# Patient Record
Sex: MALE | Race: OTHER | HISPANIC OR LATINO | Employment: FULL TIME | URBAN - METROPOLITAN AREA
[De-identification: names, ages, dates, MRNs, and addresses within clinical notes are randomized per-mention and may not be internally consistent; named-entity substitution may affect disease eponyms.]

---

## 2020-06-15 PROBLEM — R82.90 ABNORMAL URINE: Status: ACTIVE | Noted: 2020-06-15

## 2020-06-15 PROBLEM — D72.829 LEUKOCYTOSIS: Status: ACTIVE | Noted: 2020-06-15

## 2020-06-15 PROBLEM — R73.9 HYPERGLYCEMIA: Status: ACTIVE | Noted: 2020-06-15

## 2020-06-15 PROBLEM — Z78.9 ALCOHOL USE: Status: ACTIVE | Noted: 2020-06-15

## 2020-06-15 PROBLEM — K85.90 ACUTE PANCREATITIS: Status: ACTIVE | Noted: 2020-06-15

## 2020-06-15 PROBLEM — Z72.0 TOBACCO USE: Status: ACTIVE | Noted: 2020-06-15

## 2020-06-15 PROBLEM — R79.89 ELEVATED LFTS: Status: ACTIVE | Noted: 2020-06-15

## 2020-06-15 PROBLEM — E87.1 HYPONATREMIA: Status: ACTIVE | Noted: 2020-06-15

## 2020-06-15 PROBLEM — D75.89 MACROCYTOSIS: Status: ACTIVE | Noted: 2020-06-15

## 2020-06-16 ENCOUNTER — HOSPITAL ENCOUNTER (INPATIENT)
Facility: MEDICAL CENTER | Age: 35
LOS: 4 days | DRG: 439 | End: 2020-06-20
Attending: HOSPITALIST | Admitting: INTERNAL MEDICINE
Payer: COMMERCIAL

## 2020-06-16 ENCOUNTER — HOSPITAL ENCOUNTER (OUTPATIENT)
Facility: MEDICAL CENTER | Age: 35
DRG: 439 | End: 2020-06-16
Admitting: HOSPITALIST
Payer: COMMERCIAL

## 2020-06-16 PROBLEM — E78.1 HYPERTRIGLYCERIDEMIA: Status: ACTIVE | Noted: 2020-06-16

## 2020-06-16 PROBLEM — E83.51 HYPOCALCEMIA: Status: ACTIVE | Noted: 2020-06-16

## 2020-06-16 LAB
ANION GAP SERPL CALC-SCNC: 11 MMOL/L (ref 7–16)
BUN SERPL-MCNC: 27 MG/DL (ref 8–22)
CALCIUM SERPL-MCNC: 7.1 MG/DL (ref 8.5–10.5)
CHLORIDE SERPL-SCNC: 103 MMOL/L (ref 96–112)
CO2 SERPL-SCNC: 15 MMOL/L (ref 20–33)
CREAT SERPL-MCNC: 1.16 MG/DL (ref 0.5–1.4)
GLUCOSE SERPL-MCNC: 162 MG/DL (ref 65–99)
MAGNESIUM SERPL-MCNC: 1.8 MG/DL (ref 1.5–2.5)
POTASSIUM SERPL-SCNC: 4.2 MMOL/L (ref 3.6–5.5)
SODIUM SERPL-SCNC: 129 MMOL/L (ref 135–145)
TRIGL SERPL-MCNC: 668 MG/DL (ref 0–149)

## 2020-06-16 PROCEDURE — 84478 ASSAY OF TRIGLYCERIDES: CPT

## 2020-06-16 PROCEDURE — 80048 BASIC METABOLIC PNL TOTAL CA: CPT | Mod: 91

## 2020-06-16 PROCEDURE — 700111 HCHG RX REV CODE 636 W/ 250 OVERRIDE (IP): Performed by: PSYCHIATRY & NEUROLOGY

## 2020-06-16 PROCEDURE — 770022 HCHG ROOM/CARE - ICU (200)

## 2020-06-16 PROCEDURE — 700105 HCHG RX REV CODE 258: Performed by: PSYCHIATRY & NEUROLOGY

## 2020-06-16 PROCEDURE — 700105 HCHG RX REV CODE 258: Performed by: INTERNAL MEDICINE

## 2020-06-16 PROCEDURE — 82962 GLUCOSE BLOOD TEST: CPT | Mod: 91

## 2020-06-16 PROCEDURE — 99291 CRITICAL CARE FIRST HOUR: CPT | Performed by: PSYCHIATRY & NEUROLOGY

## 2020-06-16 PROCEDURE — 83735 ASSAY OF MAGNESIUM: CPT

## 2020-06-16 PROCEDURE — 700102 HCHG RX REV CODE 250 W/ 637 OVERRIDE(OP): Performed by: PSYCHIATRY & NEUROLOGY

## 2020-06-16 RX ORDER — SODIUM CHLORIDE, SODIUM LACTATE, POTASSIUM CHLORIDE, AND CALCIUM CHLORIDE .6; .31; .03; .02 G/100ML; G/100ML; G/100ML; G/100ML
1000 INJECTION, SOLUTION INTRAVENOUS ONCE
Status: COMPLETED | OUTPATIENT
Start: 2020-06-16 | End: 2020-06-17

## 2020-06-16 RX ORDER — DEXTROSE AND SODIUM CHLORIDE 10; .45 G/100ML; G/100ML
INJECTION, SOLUTION INTRAVENOUS CONTINUOUS
Status: DISCONTINUED | OUTPATIENT
Start: 2020-06-17 | End: 2020-06-18

## 2020-06-16 RX ORDER — SODIUM CHLORIDE, SODIUM LACTATE, POTASSIUM CHLORIDE, CALCIUM CHLORIDE 600; 310; 30; 20 MG/100ML; MG/100ML; MG/100ML; MG/100ML
INJECTION, SOLUTION INTRAVENOUS
Status: ACTIVE
Start: 2020-06-16 | End: 2020-06-17

## 2020-06-16 RX ORDER — SODIUM CHLORIDE, SODIUM LACTATE, POTASSIUM CHLORIDE, CALCIUM CHLORIDE 600; 310; 30; 20 MG/100ML; MG/100ML; MG/100ML; MG/100ML
2000 INJECTION, SOLUTION INTRAVENOUS CONTINUOUS
Status: DISCONTINUED | OUTPATIENT
Start: 2020-06-16 | End: 2020-06-17

## 2020-06-16 RX ORDER — DEXTROSE AND SODIUM CHLORIDE 5; .45 G/100ML; G/100ML
INJECTION, SOLUTION INTRAVENOUS
Status: ACTIVE
Start: 2020-06-16 | End: 2020-06-17

## 2020-06-16 RX ORDER — DEXTROSE MONOHYDRATE 25 G/50ML
50 INJECTION, SOLUTION INTRAVENOUS
Status: DISCONTINUED | OUTPATIENT
Start: 2020-06-16 | End: 2020-06-18

## 2020-06-16 RX ORDER — DEXTROSE MONOHYDRATE 25 G/50ML
25-50 INJECTION, SOLUTION INTRAVENOUS PRN
Status: DISCONTINUED | OUTPATIENT
Start: 2020-06-16 | End: 2020-06-16

## 2020-06-16 RX ADMIN — DEXTROSE AND SODIUM CHLORIDE: 10; .45 INJECTION, SOLUTION INTRAVENOUS at 23:51

## 2020-06-16 RX ADMIN — SODIUM CHLORIDE 10 UNITS/HR: 9 INJECTION, SOLUTION INTRAVENOUS at 23:57

## 2020-06-16 RX ADMIN — FENTANYL CITRATE 50 MCG: 50 INJECTION INTRAMUSCULAR; INTRAVENOUS at 23:32

## 2020-06-16 RX ADMIN — SODIUM CHLORIDE, POTASSIUM CHLORIDE, SODIUM LACTATE AND CALCIUM CHLORIDE 1000 ML: 600; 310; 30; 20 INJECTION, SOLUTION INTRAVENOUS at 23:13

## 2020-06-16 RX ADMIN — SODIUM CHLORIDE, POTASSIUM CHLORIDE, SODIUM LACTATE AND CALCIUM CHLORIDE 2000 ML: 600; 310; 30; 20 INJECTION, SOLUTION INTRAVENOUS at 23:38

## 2020-06-16 ASSESSMENT — ENCOUNTER SYMPTOMS
ABDOMINAL PAIN: 1
MUSCULOSKELETAL NEGATIVE: 1
NEUROLOGICAL NEGATIVE: 1
FEVER: 0
WEIGHT LOSS: 0
COUGH: 0
VOMITING: 0
CHILLS: 0
SHORTNESS OF BREATH: 1
EYES NEGATIVE: 1
PSYCHIATRIC NEGATIVE: 1
NAUSEA: 1
CONSTIPATION: 1

## 2020-06-16 ASSESSMENT — COGNITIVE AND FUNCTIONAL STATUS - GENERAL
MOBILITY SCORE: 24
SUGGESTED CMS G CODE MODIFIER DAILY ACTIVITY: CH
DAILY ACTIVITIY SCORE: 24
SUGGESTED CMS G CODE MODIFIER MOBILITY: CH

## 2020-06-16 ASSESSMENT — LIFESTYLE VARIABLES
CONSUMPTION TOTAL: POSITIVE
EVER HAD A DRINK FIRST THING IN THE MORNING TO STEADY YOUR NERVES TO GET RID OF A HANGOVER: NO
EVER_SMOKED: YES
TOTAL SCORE: 2
ON A TYPICAL DAY WHEN YOU DRINK ALCOHOL HOW MANY DRINKS DO YOU HAVE: 2
HAVE YOU EVER FELT YOU SHOULD CUT DOWN ON YOUR DRINKING: YES
EVER FELT BAD OR GUILTY ABOUT YOUR DRINKING: NO
TOTAL SCORE: 2
AVERAGE NUMBER OF DAYS PER WEEK YOU HAVE A DRINK CONTAINING ALCOHOL: 14
DOES PATIENT WANT TO STOP DRINKING: YES
PACK_YEARS: 1
HOW MANY TIMES IN THE PAST YEAR HAVE YOU HAD 5 OR MORE DRINKS IN A DAY: 3
DOES PATIENT WANT TO TALK TO SOMEONE ABOUT QUITTING: NO
TOTAL SCORE: 2
ALCOHOL_USE: YES
HAVE PEOPLE ANNOYED YOU BY CRITICIZING YOUR DRINKING: YES

## 2020-06-16 ASSESSMENT — FIBROSIS 4 INDEX: FIB4 SCORE: 1.82

## 2020-06-16 ASSESSMENT — PATIENT HEALTH QUESTIONNAIRE - PHQ9
1. LITTLE INTEREST OR PLEASURE IN DOING THINGS: NOT AT ALL
2. FEELING DOWN, DEPRESSED, IRRITABLE, OR HOPELESS: NOT AT ALL
SUM OF ALL RESPONSES TO PHQ9 QUESTIONS 1 AND 2: 0

## 2020-06-17 ENCOUNTER — APPOINTMENT (OUTPATIENT)
Dept: RADIOLOGY | Facility: MEDICAL CENTER | Age: 35
DRG: 439 | End: 2020-06-17
Attending: INTERNAL MEDICINE
Payer: COMMERCIAL

## 2020-06-17 LAB
ANION GAP SERPL CALC-SCNC: 7 MMOL/L (ref 7–16)
ANISOCYTOSIS BLD QL SMEAR: ABNORMAL
BASO STIPL BLD QL SMEAR: NORMAL
BASOPHILS # BLD AUTO: 0 % (ref 0–1.8)
BASOPHILS # BLD AUTO: 0 % (ref 0–1.8)
BASOPHILS # BLD: 0 K/UL (ref 0–0.12)
BASOPHILS # BLD: 0 K/UL (ref 0–0.12)
BUN SERPL-MCNC: 20 MG/DL (ref 8–22)
CALCIUM SERPL-MCNC: 7.3 MG/DL (ref 8.5–10.5)
CHLORIDE SERPL-SCNC: 104 MMOL/L (ref 96–112)
CO2 SERPL-SCNC: 18 MMOL/L (ref 20–33)
CREAT SERPL-MCNC: 0.83 MG/DL (ref 0.5–1.4)
EKG IMPRESSION: NORMAL
EOSINOPHIL # BLD AUTO: 0 K/UL (ref 0–0.51)
EOSINOPHIL # BLD AUTO: 0.12 K/UL (ref 0–0.51)
EOSINOPHIL NFR BLD: 0 % (ref 0–6.9)
EOSINOPHIL NFR BLD: 1.7 % (ref 0–6.9)
ERYTHROCYTE [DISTWIDTH] IN BLOOD BY AUTOMATED COUNT: 53.5 FL (ref 35.9–50)
ERYTHROCYTE [DISTWIDTH] IN BLOOD BY AUTOMATED COUNT: 55.5 FL (ref 35.9–50)
GLUCOSE BLD-MCNC: 100 MG/DL (ref 65–99)
GLUCOSE BLD-MCNC: 107 MG/DL (ref 65–99)
GLUCOSE BLD-MCNC: 108 MG/DL (ref 65–99)
GLUCOSE BLD-MCNC: 110 MG/DL (ref 65–99)
GLUCOSE BLD-MCNC: 111 MG/DL (ref 65–99)
GLUCOSE BLD-MCNC: 114 MG/DL (ref 65–99)
GLUCOSE BLD-MCNC: 114 MG/DL (ref 65–99)
GLUCOSE BLD-MCNC: 115 MG/DL (ref 65–99)
GLUCOSE BLD-MCNC: 116 MG/DL (ref 65–99)
GLUCOSE BLD-MCNC: 120 MG/DL (ref 65–99)
GLUCOSE BLD-MCNC: 120 MG/DL (ref 65–99)
GLUCOSE BLD-MCNC: 121 MG/DL (ref 65–99)
GLUCOSE BLD-MCNC: 122 MG/DL (ref 65–99)
GLUCOSE BLD-MCNC: 123 MG/DL (ref 65–99)
GLUCOSE BLD-MCNC: 124 MG/DL (ref 65–99)
GLUCOSE BLD-MCNC: 126 MG/DL (ref 65–99)
GLUCOSE BLD-MCNC: 128 MG/DL (ref 65–99)
GLUCOSE BLD-MCNC: 133 MG/DL (ref 65–99)
GLUCOSE BLD-MCNC: 141 MG/DL (ref 65–99)
GLUCOSE BLD-MCNC: 141 MG/DL (ref 65–99)
GLUCOSE BLD-MCNC: 142 MG/DL (ref 65–99)
GLUCOSE BLD-MCNC: 144 MG/DL (ref 65–99)
GLUCOSE BLD-MCNC: 145 MG/DL (ref 65–99)
GLUCOSE BLD-MCNC: 149 MG/DL (ref 65–99)
GLUCOSE BLD-MCNC: 154 MG/DL (ref 65–99)
GLUCOSE BLD-MCNC: 154 MG/DL (ref 65–99)
GLUCOSE BLD-MCNC: 171 MG/DL (ref 65–99)
GLUCOSE BLD-MCNC: 174 MG/DL (ref 65–99)
GLUCOSE BLD-MCNC: 92 MG/DL (ref 65–99)
GLUCOSE SERPL-MCNC: 125 MG/DL (ref 65–99)
HCT VFR BLD AUTO: 45.8 % (ref 42–52)
HCT VFR BLD AUTO: 46.4 % (ref 42–52)
HGB BLD-MCNC: 15.4 G/DL (ref 14–18)
HGB BLD-MCNC: 15.4 G/DL (ref 14–18)
LACTATE BLD-SCNC: 1.4 MMOL/L (ref 0.5–2)
LIPASE SERPL-CCNC: 811 U/L (ref 11–82)
LYMPHOCYTES # BLD AUTO: 0.96 K/UL (ref 1–4.8)
LYMPHOCYTES # BLD AUTO: 1.07 K/UL (ref 1–4.8)
LYMPHOCYTES NFR BLD: 13.2 % (ref 22–41)
LYMPHOCYTES NFR BLD: 14.9 % (ref 22–41)
MACROCYTES BLD QL SMEAR: ABNORMAL
MAGNESIUM SERPL-MCNC: 2.1 MG/DL (ref 1.5–2.5)
MANUAL DIFF BLD: NORMAL
MANUAL DIFF BLD: NORMAL
MCH RBC QN AUTO: 33.1 PG (ref 27–33)
MCH RBC QN AUTO: 33.3 PG (ref 27–33)
MCHC RBC AUTO-ENTMCNC: 33.2 G/DL (ref 33.7–35.3)
MCHC RBC AUTO-ENTMCNC: 33.6 G/DL (ref 33.7–35.3)
MCV RBC AUTO: 99.1 FL (ref 81.4–97.8)
MCV RBC AUTO: 99.8 FL (ref 81.4–97.8)
METAMYELOCYTES NFR BLD MANUAL: 2.6 %
MICROCYTES BLD QL SMEAR: ABNORMAL
MONOCYTES # BLD AUTO: 0.48 K/UL (ref 0–0.85)
MONOCYTES # BLD AUTO: 0.57 K/UL (ref 0–0.85)
MONOCYTES NFR BLD AUTO: 6.6 % (ref 0–13.4)
MONOCYTES NFR BLD AUTO: 7.9 % (ref 0–13.4)
MORPHOLOGY BLD-IMP: NORMAL
MORPHOLOGY BLD-IMP: NORMAL
MYELOCYTES NFR BLD MANUAL: 0.8 %
NEUTROPHILS # BLD AUTO: 5.37 K/UL (ref 1.82–7.42)
NEUTROPHILS # BLD AUTO: 5.67 K/UL (ref 1.82–7.42)
NEUTROPHILS NFR BLD: 70.2 % (ref 44–72)
NEUTROPHILS NFR BLD: 77.7 % (ref 44–72)
NEUTS BAND NFR BLD MANUAL: 4.4 % (ref 0–10)
NRBC # BLD AUTO: 0 K/UL
NRBC # BLD AUTO: 0 K/UL
NRBC BLD-RTO: 0 /100 WBC
NRBC BLD-RTO: 0 /100 WBC
OVALOCYTES BLD QL SMEAR: NORMAL
PLATELET # BLD AUTO: 124 K/UL (ref 164–446)
PLATELET # BLD AUTO: 134 K/UL (ref 164–446)
PLATELET BLD QL SMEAR: NORMAL
PLATELET BLD QL SMEAR: NORMAL
PMV BLD AUTO: 10 FL (ref 9–12.9)
PMV BLD AUTO: 9.6 FL (ref 9–12.9)
POIKILOCYTOSIS BLD QL SMEAR: NORMAL
POLYCHROMASIA BLD QL SMEAR: NORMAL
POTASSIUM SERPL-SCNC: 3.9 MMOL/L (ref 3.6–5.5)
POTASSIUM SERPL-SCNC: 4 MMOL/L (ref 3.6–5.5)
POTASSIUM SERPL-SCNC: 4 MMOL/L (ref 3.6–5.5)
RBC # BLD AUTO: 4.62 M/UL (ref 4.7–6.1)
RBC # BLD AUTO: 4.65 M/UL (ref 4.7–6.1)
RBC BLD AUTO: PRESENT
SODIUM SERPL-SCNC: 129 MMOL/L (ref 135–145)
TRIGL SERPL-MCNC: 499 MG/DL (ref 0–149)
TRIGL SERPL-MCNC: 537 MG/DL (ref 0–149)
WBC # BLD AUTO: 7.2 K/UL (ref 4.8–10.8)
WBC # BLD AUTO: 7.3 K/UL (ref 4.8–10.8)

## 2020-06-17 PROCEDURE — 84478 ASSAY OF TRIGLYCERIDES: CPT

## 2020-06-17 PROCEDURE — 700105 HCHG RX REV CODE 258: Performed by: INTERNAL MEDICINE

## 2020-06-17 PROCEDURE — 93010 ELECTROCARDIOGRAM REPORT: CPT | Performed by: INTERNAL MEDICINE

## 2020-06-17 PROCEDURE — 83735 ASSAY OF MAGNESIUM: CPT

## 2020-06-17 PROCEDURE — 700111 HCHG RX REV CODE 636 W/ 250 OVERRIDE (IP): Performed by: INTERNAL MEDICINE

## 2020-06-17 PROCEDURE — 700111 HCHG RX REV CODE 636 W/ 250 OVERRIDE (IP): Performed by: PSYCHIATRY & NEUROLOGY

## 2020-06-17 PROCEDURE — 84132 ASSAY OF SERUM POTASSIUM: CPT | Mod: 91

## 2020-06-17 PROCEDURE — 83605 ASSAY OF LACTIC ACID: CPT

## 2020-06-17 PROCEDURE — 99406 BEHAV CHNG SMOKING 3-10 MIN: CPT | Performed by: HOSPITALIST

## 2020-06-17 PROCEDURE — 99292 CRITICAL CARE ADDL 30 MIN: CPT | Performed by: INTERNAL MEDICINE

## 2020-06-17 PROCEDURE — 700102 HCHG RX REV CODE 250 W/ 637 OVERRIDE(OP): Performed by: PSYCHIATRY & NEUROLOGY

## 2020-06-17 PROCEDURE — 700102 HCHG RX REV CODE 250 W/ 637 OVERRIDE(OP): Performed by: HOSPITALIST

## 2020-06-17 PROCEDURE — 80048 BASIC METABOLIC PNL TOTAL CA: CPT

## 2020-06-17 PROCEDURE — 85007 BL SMEAR W/DIFF WBC COUNT: CPT

## 2020-06-17 PROCEDURE — 3E0G76Z INTRODUCTION OF NUTRITIONAL SUBSTANCE INTO UPPER GI, VIA NATURAL OR ARTIFICIAL OPENING: ICD-10-PCS | Performed by: INTERNAL MEDICINE

## 2020-06-17 PROCEDURE — 770022 HCHG ROOM/CARE - ICU (200)

## 2020-06-17 PROCEDURE — A9270 NON-COVERED ITEM OR SERVICE: HCPCS | Performed by: HOSPITALIST

## 2020-06-17 PROCEDURE — 82962 GLUCOSE BLOOD TEST: CPT | Mod: 91

## 2020-06-17 PROCEDURE — 87040 BLOOD CULTURE FOR BACTERIA: CPT

## 2020-06-17 PROCEDURE — 99223 1ST HOSP IP/OBS HIGH 75: CPT | Mod: 25 | Performed by: HOSPITALIST

## 2020-06-17 PROCEDURE — 93005 ELECTROCARDIOGRAM TRACING: CPT | Performed by: INTERNAL MEDICINE

## 2020-06-17 PROCEDURE — 99358 PROLONG SERVICE W/O CONTACT: CPT | Performed by: HOSPITALIST

## 2020-06-17 PROCEDURE — 700105 HCHG RX REV CODE 258

## 2020-06-17 PROCEDURE — 85027 COMPLETE CBC AUTOMATED: CPT

## 2020-06-17 PROCEDURE — 700105 HCHG RX REV CODE 258: Performed by: PSYCHIATRY & NEUROLOGY

## 2020-06-17 PROCEDURE — 700101 HCHG RX REV CODE 250: Performed by: INTERNAL MEDICINE

## 2020-06-17 PROCEDURE — 700111 HCHG RX REV CODE 636 W/ 250 OVERRIDE (IP): Performed by: HOSPITALIST

## 2020-06-17 PROCEDURE — 99291 CRITICAL CARE FIRST HOUR: CPT | Performed by: INTERNAL MEDICINE

## 2020-06-17 PROCEDURE — 700102 HCHG RX REV CODE 250 W/ 637 OVERRIDE(OP): Performed by: INTERNAL MEDICINE

## 2020-06-17 PROCEDURE — 71045 X-RAY EXAM CHEST 1 VIEW: CPT

## 2020-06-17 PROCEDURE — A9270 NON-COVERED ITEM OR SERVICE: HCPCS | Performed by: INTERNAL MEDICINE

## 2020-06-17 PROCEDURE — 83690 ASSAY OF LIPASE: CPT

## 2020-06-17 RX ORDER — HYDRALAZINE HYDROCHLORIDE 20 MG/ML
10-20 INJECTION INTRAMUSCULAR; INTRAVENOUS EVERY 4 HOURS PRN
Status: DISCONTINUED | OUTPATIENT
Start: 2020-06-17 | End: 2020-06-20 | Stop reason: HOSPADM

## 2020-06-17 RX ORDER — POTASSIUM CHLORIDE 7.45 MG/ML
10 INJECTION INTRAVENOUS ONCE
Status: COMPLETED | OUTPATIENT
Start: 2020-06-17 | End: 2020-06-17

## 2020-06-17 RX ORDER — BISACODYL 10 MG
10 SUPPOSITORY, RECTAL RECTAL
Status: DISCONTINUED | OUTPATIENT
Start: 2020-06-17 | End: 2020-06-17

## 2020-06-17 RX ORDER — POLYETHYLENE GLYCOL 3350 17 G/17G
1 POWDER, FOR SOLUTION ORAL
Status: DISCONTINUED | OUTPATIENT
Start: 2020-06-17 | End: 2020-06-17

## 2020-06-17 RX ORDER — HYDRALAZINE HYDROCHLORIDE 20 MG/ML
10 INJECTION INTRAMUSCULAR; INTRAVENOUS EVERY 6 HOURS PRN
Status: DISCONTINUED | OUTPATIENT
Start: 2020-06-17 | End: 2020-06-17

## 2020-06-17 RX ORDER — HYDRALAZINE HYDROCHLORIDE 20 MG/ML
20 INJECTION INTRAMUSCULAR; INTRAVENOUS ONCE
Status: COMPLETED | OUTPATIENT
Start: 2020-06-17 | End: 2020-06-17

## 2020-06-17 RX ORDER — POLYETHYLENE GLYCOL 3350 17 G/17G
1 POWDER, FOR SOLUTION ORAL
Status: DISCONTINUED | OUTPATIENT
Start: 2020-06-17 | End: 2020-06-20 | Stop reason: HOSPADM

## 2020-06-17 RX ORDER — AMOXICILLIN 250 MG
2 CAPSULE ORAL 2 TIMES DAILY
Status: DISCONTINUED | OUTPATIENT
Start: 2020-06-17 | End: 2020-06-17

## 2020-06-17 RX ORDER — NICOTINE 21 MG/24HR
14 PATCH, TRANSDERMAL 24 HOURS TRANSDERMAL
Status: DISCONTINUED | OUTPATIENT
Start: 2020-06-17 | End: 2020-06-20 | Stop reason: HOSPADM

## 2020-06-17 RX ORDER — SODIUM CHLORIDE, SODIUM LACTATE, POTASSIUM CHLORIDE, CALCIUM CHLORIDE 600; 310; 30; 20 MG/100ML; MG/100ML; MG/100ML; MG/100ML
2000 INJECTION, SOLUTION INTRAVENOUS CONTINUOUS
Status: DISCONTINUED | OUTPATIENT
Start: 2020-06-17 | End: 2020-06-17

## 2020-06-17 RX ORDER — BISACODYL 10 MG
10 SUPPOSITORY, RECTAL RECTAL
Status: DISCONTINUED | OUTPATIENT
Start: 2020-06-17 | End: 2020-06-20 | Stop reason: HOSPADM

## 2020-06-17 RX ORDER — DEXTROSE AND SODIUM CHLORIDE 5; .45 G/100ML; G/100ML
INJECTION, SOLUTION INTRAVENOUS
Status: ACTIVE
Start: 2020-06-17 | End: 2020-06-18

## 2020-06-17 RX ORDER — SODIUM CHLORIDE 9 MG/ML
INJECTION, SOLUTION INTRAVENOUS
Status: COMPLETED
Start: 2020-06-17 | End: 2020-06-17

## 2020-06-17 RX ORDER — AMOXICILLIN 250 MG
2 CAPSULE ORAL 2 TIMES DAILY
Status: DISCONTINUED | OUTPATIENT
Start: 2020-06-17 | End: 2020-06-20 | Stop reason: HOSPADM

## 2020-06-17 RX ORDER — DEXTROSE AND SODIUM CHLORIDE 5; .45 G/100ML; G/100ML
INJECTION, SOLUTION INTRAVENOUS
Status: ACTIVE
Start: 2020-06-17 | End: 2020-06-17

## 2020-06-17 RX ORDER — LABETALOL HYDROCHLORIDE 5 MG/ML
10-20 INJECTION, SOLUTION INTRAVENOUS EVERY 4 HOURS PRN
Status: DISCONTINUED | OUTPATIENT
Start: 2020-06-17 | End: 2020-06-20 | Stop reason: HOSPADM

## 2020-06-17 RX ADMIN — DEXTROSE AND SODIUM CHLORIDE: 10; .45 INJECTION, SOLUTION INTRAVENOUS at 05:48

## 2020-06-17 RX ADMIN — FAMOTIDINE 20 MG: 10 INJECTION, SOLUTION INTRAVENOUS at 10:40

## 2020-06-17 RX ADMIN — FENTANYL CITRATE 50 MCG: 50 INJECTION INTRAMUSCULAR; INTRAVENOUS at 05:15

## 2020-06-17 RX ADMIN — HYDRALAZINE HYDROCHLORIDE 10 MG: 20 INJECTION INTRAMUSCULAR; INTRAVENOUS at 04:43

## 2020-06-17 RX ADMIN — DOCUSATE SODIUM 50 MG AND SENNOSIDES 8.6 MG 2 TABLET: 8.6; 5 TABLET, FILM COATED ORAL at 10:40

## 2020-06-17 RX ADMIN — PIPERACILLIN AND TAZOBACTAM 4.5 G: 4; .5 INJECTION, POWDER, LYOPHILIZED, FOR SOLUTION INTRAVENOUS; PARENTERAL at 10:21

## 2020-06-17 RX ADMIN — FENTANYL CITRATE 50 MCG: 50 INJECTION INTRAMUSCULAR; INTRAVENOUS at 01:16

## 2020-06-17 RX ADMIN — HYDRALAZINE HYDROCHLORIDE 20 MG: 20 INJECTION INTRAMUSCULAR; INTRAVENOUS at 08:16

## 2020-06-17 RX ADMIN — FENTANYL CITRATE 50 MCG: 50 INJECTION INTRAMUSCULAR; INTRAVENOUS at 15:07

## 2020-06-17 RX ADMIN — DEXTROSE AND SODIUM CHLORIDE 1000 ML: 10; .45 INJECTION, SOLUTION INTRAVENOUS at 17:31

## 2020-06-17 RX ADMIN — SODIUM CHLORIDE 10 UNITS/HR: 9 INJECTION, SOLUTION INTRAVENOUS at 05:47

## 2020-06-17 RX ADMIN — FENTANYL CITRATE 50 MCG: 50 INJECTION INTRAMUSCULAR; INTRAVENOUS at 18:42

## 2020-06-17 RX ADMIN — LABETALOL HYDROCHLORIDE 10 MG: 5 INJECTION, SOLUTION INTRAVENOUS at 15:08

## 2020-06-17 RX ADMIN — DOCUSATE SODIUM 50 MG AND SENNOSIDES 8.6 MG 2 TABLET: 8.6; 5 TABLET, FILM COATED ORAL at 17:34

## 2020-06-17 RX ADMIN — FENTANYL CITRATE 50 MCG: 50 INJECTION INTRAMUSCULAR; INTRAVENOUS at 03:09

## 2020-06-17 RX ADMIN — POTASSIUM CHLORIDE 10 MEQ: 7.46 INJECTION, SOLUTION INTRAVENOUS at 00:19

## 2020-06-17 RX ADMIN — PIPERACILLIN AND TAZOBACTAM 4.5 G: 4; .5 INJECTION, POWDER, LYOPHILIZED, FOR SOLUTION INTRAVENOUS; PARENTERAL at 21:04

## 2020-06-17 RX ADMIN — PIPERACILLIN AND TAZOBACTAM 4.5 G: 4; .5 INJECTION, POWDER, LYOPHILIZED, FOR SOLUTION INTRAVENOUS; PARENTERAL at 13:44

## 2020-06-17 RX ADMIN — FENTANYL CITRATE 50 MCG: 50 INJECTION INTRAMUSCULAR; INTRAVENOUS at 11:48

## 2020-06-17 RX ADMIN — FAMOTIDINE 20 MG: 10 INJECTION, SOLUTION INTRAVENOUS at 17:34

## 2020-06-17 RX ADMIN — SODIUM CHLORIDE, POTASSIUM CHLORIDE, SODIUM LACTATE AND CALCIUM CHLORIDE 2000 ML: 600; 310; 30; 20 INJECTION, SOLUTION INTRAVENOUS at 05:23

## 2020-06-17 RX ADMIN — POTASSIUM CHLORIDE 10 MEQ: 7.46 INJECTION, SOLUTION INTRAVENOUS at 08:20

## 2020-06-17 RX ADMIN — POTASSIUM CHLORIDE 10 MEQ: 7.46 INJECTION, SOLUTION INTRAVENOUS at 20:01

## 2020-06-17 RX ADMIN — FENTANYL CITRATE 50 MCG: 50 INJECTION INTRAMUSCULAR; INTRAVENOUS at 21:18

## 2020-06-17 RX ADMIN — POTASSIUM CHLORIDE 10 MEQ: 7.46 INJECTION, SOLUTION INTRAVENOUS at 13:45

## 2020-06-17 RX ADMIN — DEXTROSE AND SODIUM CHLORIDE 1000 ML: 10; .45 INJECTION, SOLUTION INTRAVENOUS at 22:15

## 2020-06-17 RX ADMIN — Medication 75 MCG/HR: at 04:11

## 2020-06-17 RX ADMIN — SODIUM CHLORIDE 5 MG/HR: 9 INJECTION, SOLUTION INTRAVENOUS at 19:50

## 2020-06-17 RX ADMIN — SODIUM CHLORIDE 2.5 UNITS/HR: 9 INJECTION, SOLUTION INTRAVENOUS at 16:45

## 2020-06-17 RX ADMIN — SODIUM CHLORIDE 500 ML: 9 INJECTION, SOLUTION INTRAVENOUS at 10:41

## 2020-06-17 RX ADMIN — FENTANYL CITRATE 50 MCG: 50 INJECTION INTRAMUSCULAR; INTRAVENOUS at 07:41

## 2020-06-17 RX ADMIN — SODIUM CHLORIDE 5 UNITS/HR: 9 INJECTION, SOLUTION INTRAVENOUS at 08:19

## 2020-06-17 RX ADMIN — Medication 25 MCG/HR: at 01:28

## 2020-06-17 RX ADMIN — NICOTINE 14 MG: 14 PATCH TRANSDERMAL at 05:35

## 2020-06-17 RX ADMIN — ENOXAPARIN SODIUM 40 MG: 40 INJECTION SUBCUTANEOUS at 10:21

## 2020-06-17 ASSESSMENT — ENCOUNTER SYMPTOMS
VOMITING: 0
DIZZINESS: 0
ABDOMINAL PAIN: 1
HEADACHES: 0
NAUSEA: 0
PHOTOPHOBIA: 0
FOCAL WEAKNESS: 0
SORE THROAT: 0
SPUTUM PRODUCTION: 0
PALPITATIONS: 0
DEPRESSION: 0
SHORTNESS OF BREATH: 1
COUGH: 0
FEVER: 1
SHORTNESS OF BREATH: 0
TINGLING: 0
FEVER: 0
DIARRHEA: 0
NAUSEA: 1
CHILLS: 0
WHEEZING: 0
BLURRED VISION: 0
MYALGIAS: 0

## 2020-06-17 ASSESSMENT — FIBROSIS 4 INDEX
FIB4 SCORE: 2.38
FIB4 SCORE: 1.82

## 2020-06-17 NOTE — PROGRESS NOTES
Pt in 8-10/10 pain much of the night. Tachycardic, tachypnic and hypertensive. Dr. Batres notified and orders received. Plan of care discussed with pt. Pt aware that pain will not be completely erased however, goal is to get pain to a manageable level. Pt verbalizes understanding of pain management plan. RASS +1/+2. Intermittent rest after fentanyl pushes. Remains on 5L NC. Will continue to monitor closely.

## 2020-06-17 NOTE — ASSESSMENT & PLAN NOTE
Likely from TG and alcohol  Pain control  Advance diet to low-fat  Aggressive electrolyte optimization  Lasix 40 mg

## 2020-06-17 NOTE — PROGRESS NOTES
Pt complaining of increased SOB stating, 'I feel like I'm choking.' Pt anxious to get to EOB. Pt assisted to EOB. O2 remaining stable in mid 90's throughout. Remains tachypnic. States notable relief at EOB. Lung sounds remain clear with diminished bases. Dr. Medardo españa. Orders for ABG, CXR, and EKG. Pt currently comfortable in appearance and verbalizing increased comfort. HR remains 140's, SBP back up to 170's.

## 2020-06-17 NOTE — H&P
Hospital Medicine History & Physical Note    Date of Service  6/17/2020    Primary Care Physician  Pcp Pt States None    Consultants  Dr. Valverde, pulmonary  Dr. Sanchez, GI    Code Status  Full    Chief Complaint  No chief complaint on file.      History of Presenting Illness  34 y.o. male who presented on 6/16/2020 in transfer from outside facility for acute pancreatitis requiring specialist consultation to discuss plasmapheresis.  This is a 34-year-old gentleman with a history of alcohol use/abuse who had presented to an outside facility on [facility] complaining of back and neck pain.  The patient was diagnosed with acute pancreatitis and transaminitis and was admitted for further treatment at the outside facility.  The patient's past medical history significant for daily alcohol intake.  Otherwise, he denies any chronic medical issues.  Patient underwent a thorough work-up at the outside facility including MRCP which was reportedly unrevealing showing a normal gallbladder with no CBD noted.  Patient was treated conservatively without significant improvement and had an upward trending lipase level.  The outside facility had consulted GI and they recommended plasma exchange therefore he was transferred to our facility for higher level of care and specialist consultation.    Work-up at the outside facility including ultrasound which showed fatty infiltration liver   Minimal free fluid along the right lobe of the liver and within the keira hepatis X. The common bile duct was not visualized.  MRCP also could not identify common bile duct but did note extensive fluid and inflammation surrounding the pancreas compatible with acute pancreatitis.  Additionally, patient was noted to have hypertriglyceridemia.    Review of Systems  Review of Systems   Constitutional: Negative for chills and fever.   HENT: Negative for congestion and sore throat.    Eyes: Negative for photophobia.   Respiratory: Negative for cough, shortness of  breath and wheezing.    Cardiovascular: Negative for chest pain and palpitations.   Gastrointestinal: Positive for abdominal pain and nausea. Negative for diarrhea and vomiting.   Genitourinary: Negative for dysuria.   Musculoskeletal: Negative for myalgias.   Skin: Negative.    Neurological: Negative for dizziness, tingling, focal weakness and headaches.   Psychiatric/Behavioral: Negative for depression and suicidal ideas.       Past Medical History  Past Medical History:   Diagnosis Date   • Sinus congestion        Surgical History  None reported    Family History  None reported    Social History  Social History     Tobacco Use   • Smoking status: Current Every Day Smoker   • Smokeless tobacco: Never Used   Substance Use Topics   • Alcohol use: Not Currently     Frequency: 2-4 times a month   • Drug use: Never     Comment: mj       Allergies  No Known Allergies    Medications  No current facility-administered medications on file prior to encounter.      No current outpatient medications on file prior to encounter.       Physical Exam  Hemodynamics  Temp (24hrs), Av.8 °C (98.3 °F), Min:36.8 °C (98.3 °F), Max:36.8 °C (98.3 °F)   Temperature: 36.8 °C (98.3 °F)  Pulse  Av  Min: 131  Max: 133    Blood Pressure: (!) 188/96      Respiratory      Respiration: (!) 28, Pulse Oximetry: 94 %             Physical Exam   Constitutional: He is oriented to person, place, and time. No distress.   HENT:   Head: Normocephalic and atraumatic.   Right Ear: External ear normal.   Left Ear: External ear normal.   Eyes: EOM are normal. Right eye exhibits no discharge. Left eye exhibits no discharge.   Neck: Neck supple. No JVD present.   Cardiovascular: Normal rate, regular rhythm and normal heart sounds.   Pulmonary/Chest: Effort normal and breath sounds normal. No respiratory distress. He exhibits no tenderness.   Abdominal: Soft. Bowel sounds are normal. He exhibits no distension. There is no abdominal tenderness.    Musculoskeletal:         General: No edema.   Neurological: He is alert and oriented to person, place, and time. No cranial nerve deficit.   Skin: Skin is dry. He is not diaphoretic. No erythema.   Psychiatric: He has a normal mood and affect. His behavior is normal.   Nursing note and vitals reviewed.    Capillary refill less than 3 seconds, distal pulses intact    Laboratory:  Recent Labs     06/15/20  1143 06/16/20  0605   WBC 14.4* 8.2   RBC 5.43 6.13*   HEMOGLOBIN 18.5* 20.4*   HEMATOCRIT 51.6 59.5*   MCV 95.0* 97.1*   MCH 34.1* 33.3*   MCHC 35.9 34.3   RDW 14.5 16.1*   PLATELETCT 196 175   MPV 9.8 9.8     Recent Labs     06/16/20  1205 06/16/20  1600 06/16/20  2310   SODIUM 132* 129* 129*   POTASSIUM 5.4* 4.4 4.2   CHLORIDE 107 107 103   CO2 14* 14* 15*   GLUCOSE 170* 134* 162*   BUN 32* 31* 27*   CREATININE 2.1* 1.5* 1.16   CALCIUM 7.3* 6.7* 7.1*     Recent Labs     06/15/20  1143 06/16/20  0605 06/16/20  1205 06/16/20  1600 06/16/20  2310   ALTSGPT 94* 48  --   --   --    ASTSGOT 83* 65*  --   --   --    ALKPHOSPHAT 133* 67  --   --   --    TBILIRUBIN 2.4* 1.8*  --   --   --    DBILIRUBIN 1.1*  --   --   --   --    LIPASE 3539* 5269*  --   --   --    GLUCOSE 158* 208* 170* 134* 162*             Recent Labs     06/16/20  0605 06/16/20  2310   TRIGLYCERIDE 1307* 668*   HDL 25*  --    LDL <30  --      No results found for: TROPONINI    Imaging  [id number]    Result Date: 6/15/2020  HISTORY/REASON FOR EXAM:  Abdominal pain, acute, nonlocalized; Pancreatitis, eval for obstruction. TECHNIQUE/EXAM DESCRIPTION: MRI of the abdomen without contrast , 6/15/2020 2:14 PM. Multiplanar multisequence imaging of the abdomen was performed in the usual manner. MRCP also performed COMPARISON: Abdomen/pelvis CT dated 6/15/2020 FINDINGS: Exam is markedly limited by motion artifact Extensive inflammatory changes surrounding the pancreas No contained peripancreatic fluid collections Wall thickening and fluid surrounding the  duodenum The liver is unremarkable. No intrahepatic ductal dilatation. The gallbladder is unremarkable. No filling defects The common bile duct is not definitively visualized due to the extensive inflammatory changes and fluid The spleen and kidneys are unremarkable The visualized adrenal glands are unremarkable     Extensive motion artifact limits examination Common bile duct is not identified Extensive fluid and inflammation surrounding the pancreas compatible with acute pancreatitis Wall thickening with fluid surrounding the duodenum compatible with duodenitis. Likely sequelae of the pancreatitis.    [id number]    Result Date: 6/15/2020  HISTORY/REASON FOR EXAM:  Pain. TECHNIQUE/EXAM DESCRIPTION: Right upper quadrant ultrasound,  6/15/2020 3:36 PM. COMPARISON: None. FINDINGS: The pancreas is partially obscured by overlying bowel gas The liver is echogenic. No focal masses. Measures 18.3 cm Normal gallbladder. Negative sonographic Whitten sign The common bile duct is not seen by overlying bowel gas The right kidney measures 11.2 cm. No hydronephrosis. No nephrolithiasis. Minimal free fluid is present along the right lobe of the liver and within the keira hepatis.     Fatty infiltration liver Minimal free fluid along the right lobe of the liver and within the keira hepatis X The common bile duct was not visualized    Ct Abdomen-pelvis With Iv Contrast    Result Date: 6/15/2020  HISTORY/REASON FOR EXAM:  Abdominal distension; Abdominal infection suspected. TECHNIQUE/EXAM DESCRIPTION: CT scan of the abdomen and pelvis with IV contrast, 6/15/2020 12:17 PM. This examination was conducted with Automated Exposure Control and Automated Adjustment of Tube Current based on patient size. Following the administration of IV contrast, helical imaging was performed from the dome of the diaphragm to the pubic symphysis. Total DLP: 781 mGy*cm COMPARISON: None. FINDINGS: Lung bases:  The lung bases demonstrate no basilar pleural  effusion.  The heart is normal in size. Liver:  Liver demonstrates evidence of mild diffuse fatty infiltration, but otherwise appears unremarkable. The portal vein is patent. Gallbladder:  The gallbladder appears unremarkable. Spleen:  The spleen appears normal. Stomach and pancreas:  There is extensive infiltration of omental fat surrounding the pancreatic head and the descending duodenum. The pancreatic head parenchyma demonstrates abnormal decreased enhancement. The pancreatic head appears enlarged, and the margins of the pancreatic head are ill-defined. Infiltration of omental fat extends distally to involve the posterior omental fat of the right mid abdomen. There is layering of free fluid within the anterior right pararenal space, and within the right paracolic gutter. Together the findings are most compatible with severe acute pancreatitis. Urinalysis would be a secondary, less likely consideration, but cannot be completely excluded. Adrenal glands:  No adrenal mass is identified. Kidneys:  The kidneys enhance promptly and symmetrically. There is no evidence of hydronephrosis or parenchymal lesions. Bowel: I do not see evidence of large or small bowel obstruction. The appendix is identified and appears normal. I see no evidence of intraperitoneal free air. No pathologically enlarged lymphnodes are identified. No destructive osseous lesion is noted     There is extensive inflammatory changes of the upper abdomen, centered at the level of the pancreatic head . The findings are most compatible with acute pancreatitis. less likely consideration but cannot be completely excluded. Ever Flower MD 6/15/2020 12:49 PM DLP Reporting Thresholds for Incorrect/Repeated Exams - DLP in mGy*cm Head/Neck:  0-year-old 3840, 1-year-old 5880, 5-year-old 8770, 10-year-old 43407 and adult [id number] Head:  0-year-old 4540, 1-year-old 7460, 5-year-old 15693, 10-year-old 08976 and adult 37512 Neck:  0-year-old 2940, 1-year-old 4160,  5-year-old 4550, 10-year-old 6320 and adult 8470 Chest:  0-year-old 550, 1-year-old 830, 5-year-old 1200, 10-year-old 3840 and adult 3570 Abd/pelvis:  0-year-old 440, 1-year-old 720, 5-year-old 1080, 10-year-old 3330 and adult 3330 Trunk(C/A/P):  0-year-old 490, 1-year-old 770, 5-year-old 1140, 10-year-old 3570 and adult 3330        Assessment/Plan:  Anticipate that patient will need greater than 2 midnights for management of the discussed medical issues.    Acute pancreatitis- (present on admission)  Assessment & Plan  Multifactorial, suspect familial hypertriglyceridemia worsened by alcohol intake.  Patient will continue to be strictly n.p.o. and maintained on D5 NS with sliding scale insulin.  Continue IV fluid hydration and symptomatic management for pain.  Monitor LFTs and avoid hepatotoxic medications.  GI following for consideration of plasmapheresis.    Alcohol use- (present on admission)  Assessment & Plan  Precedex and standing benzodiazepine per ICU protocol.    Hyponatremia- (present on admission)  Assessment & Plan  Mild, no mental status changes, continue IV fluids and monitor chemistries.    Leukocytosis- (present on admission)  Assessment & Plan  Likely reactive, hold off on antibiotic therapy unless something declares itself.    Tobacco abuse  Assessment & Plan  This patient continues to smoke cigarettes. 4 minutes were spent counseling the patient in cessation techniques. Patient understands smoking increases risk factors for stroke and death. Patient is open to counseling.  The benefits of stopping were presented and nicotine replacement has been ordered to assist with withdrawal from nicotine during hospitalization and we will continue to encourage cessation and discuss other supportive resources including community groups and prescription medications.      Prophylaxis: Sequential compression devices for DVT prophylaxis, no PPI indicated, bowel protocol as needed    I spent a total of 35 minutes  of non face to face time performing additional research, reviewing medical records from transferring facility, discussing plan of care with other healthcare providers. Start time: 11:50PM. End time: 12:25AM.

## 2020-06-17 NOTE — PROGRESS NOTES
Critical Care Progress Note    Date of admission  6/16/2020    Chief Complaint  34 y.o. male admitted 6/16/2020 with acute pancreatitis    Hospital Course    34 y.o. male who presented 6/16/2020 as a tx from an OSH for acute pancreatitis. Symptoms of back N&V and abdominal pain started 6/15. He was admitted to the OSH and worked up and found to have elevated triglycerides. It was suspected that his pancreatitis was related to elevated triglycerides and alcohol. MRCP was performed to evaluate for obstruction but the CBD was not visualized. He was started on an insulin gtt at the OSH hospital and transferred in the event that he may need plasmapheresis. On arrival he is tachycardic with MAPS > 65 on room air, complaining of mild abdominal pain. Neph and Gi were contacted for consultation prior to arrival      Interval Problem Update  Reviewed last 24 hour events:  T-max 98.6  +3 L over last 24 hours  CXR with pulmonary edema  MRI abdomen suboptimal  WBC 8.2 with 26% bands    MRSA nares 6-15 NEG    D10 half NS@275  Fentanyl@50  Insulin@10  LR@100    93% on 5 L  Last BM PTA    Addendum  I have assessed and reassessed numerous times throughout the day.  I have adjusted volume as well as infusion rates to attempt to minimize risk of decompensation from respiratory standpoint.  He will likely third space significant volume and was closely monitored for interval development of compartment syndrome which may require serial bladder pressures.  At this time he remains on insulin infusion, fentanyl infusion with continuous end-tidal CO2 monitoring.  He remains at extreme risk for deterioration/decompensation, multiple organ failure, and death    Review of Systems  Review of Systems   Constitutional: Positive for fever and malaise/fatigue. Negative for chills.   HENT: Negative for congestion.    Eyes: Negative for blurred vision.   Respiratory: Positive for shortness of breath. Negative for sputum production.    Cardiovascular:  Negative for chest pain and palpitations.   Gastrointestinal: Positive for abdominal pain. Negative for nausea and vomiting.   Neurological: Negative for focal weakness.   Psychiatric/Behavioral: Negative for depression.   All other systems reviewed and are negative.       Vital Signs for last 24 hours   Temp:  [36.7 °C (98.1 °F)-37 °C (98.6 °F)] 36.7 °C (98.1 °F)  Pulse:  [131-142] 133  Resp:  [19-50] 24  BP: (112-188)/() 167/99  SpO2:  [92 %-95 %] 93 %    Hemodynamic parameters for last 24 hours       Respiratory Information for the last 24 hours       Physical Exam   Physical Exam  Vitals signs and nursing note reviewed.   Constitutional:       General: He is in acute distress.      Appearance: He is ill-appearing. He is not diaphoretic.   HENT:      Head: Normocephalic and atraumatic.   Eyes:      Conjunctiva/sclera: Conjunctivae normal.      Pupils: Pupils are equal, round, and reactive to light.   Cardiovascular:      Rate and Rhythm: Tachycardia present.      Pulses: Normal pulses.   Pulmonary:      Breath sounds: Rales present. No wheezing.      Comments: Diminished  Abdominal:      General: There is distension.      Tenderness: There is abdominal tenderness. There is no guarding or rebound.   Musculoskeletal:         General: Swelling present.   Skin:     General: Skin is warm and dry.   Neurological:      Cranial Nerves: No cranial nerve deficit.   Psychiatric:         Mood and Affect: Mood normal.         Medications  Current Facility-Administered Medications   Medication Dose Route Frequency Provider Last Rate Last Dose   • nicotine (NICODERM) 14 MG/24HR 14 mg  14 mg Transdermal Daily-0600 Nery Burr M.D.   14 mg at 06/17/20 0535    And   • nicotine polacrilex (NICORETTE) 2 MG piece 2 mg  2 mg Oral Q HOUR PRN Nery Burr M.D.       • hydrALAZINE (APRESOLINE) injection 10 mg  10 mg Intravenous Q6HRS PRN Nery Burr M.D.   10 mg at 06/17/20 4213   • fentaNYL (SUBLIMAZE) 50 mcg/mL in 50mL  (Continuous Infusion)   Intravenous Continuous Brad Batres M.D. 1.5 mL/hr at 06/17/20 0411 75 mcg/hr at 06/17/20 0411   • lactated ringers infusion  2,000 mL Intravenous Continuous Carter Ferrer M.D. 100 mL/hr at 06/17/20 0523 2,000 mL at 06/17/20 0523   • DEXTROSE-NACL 5-0.45 % IV SOLN            • potassium chloride (KCL) ivpb 10 mEq  10 mEq Intravenous Once Brad Batres M.D.       • LACTATED RINGERS IV SOLN            • MD Alert...ICU Electrolyte Replacement per Pharmacy   Other PHARMACY TO DOSE Carter Ferrer M.D.       • fentaNYL (SUBLIMAZE) injection 25-50 mcg  25-50 mcg Intravenous Q2HRS PRN Carter Ferrer M.D.   50 mcg at 06/17/20 0515   • D10%-0.45% NaCl infusion   Intravenous Continuous Carter Ferrer M.D. 275 mL/hr at 06/17/20 0631     • insulin regular human (HUMULIN/NOVOLIN R) 62.5 Units in  mL Infusion  10 Units/hr Intravenous Continuous Carter Ferrer M.D. 40 mL/hr at 06/17/20 0547 10 Units/hr at 06/17/20 0547   • glucose 4 g chewable tablet 16 g  16 g Oral Q15 MIN PRN Carter Ferrer M.D.        And   • dextrose 50% (D50W) injection 50 mL  50 mL Intravenous Q15 MIN PRN Carter Ferrer M.D.       • DEXTROSE-NACL 5-0.45 % IV SOLN            • K+ Scale: Goal of 4.5  1 Each Intravenous Q6HRS Carter Ferrer M.D.   1 Each at 06/17/20 0600       Fluids    Intake/Output Summary (Last 24 hours) at 6/17/2020 0655  Last data filed at 6/17/2020 0631  Gross per 24 hour   Intake 3584.17 ml   Output 510 ml   Net 3074.17 ml       Laboratory  Recent Labs     06/16/20  1740   DHHSO40O 7.28*   [id number] 30.9   BZLAI519D 82.8*   [id number] 95.4*   ARTHCO3 14*   FIO2 60   ARTBE -11*         Recent Labs     06/15/20  1143  06/16/20  1600 06/16/20  2310 06/17/20  0540   SODIUM 136*   < > 129* 129* 129*   POTASSIUM 4.3   < > 4.4 4.2 3.9   CHLORIDE 99   < > 107 103 104   CO2 20*   < > 14* 15* 18*   BUN 15   < > 31* 27* 20   CREATININE 0.8   < > 1.5* 1.16 0.83   MAGNESIUM 2.0  --   --  1.8  --     CALCIUM 8.8   < > 6.7* 7.1* 7.3*    < > = values in this interval not displayed.     Recent Labs     06/15/20  1143 06/16/20  0605  06/16/20  1600 06/16/20  2310 06/17/20  0540   ALTSGPT 94* 48  --   --   --   --    ASTSGOT 83* 65*  --   --   --   --    ALKPHOSPHAT 133* 67  --   --   --   --    TBILIRUBIN 2.4* 1.8*  --   --   --   --    DBILIRUBIN 1.1*  --   --   --   --   --    LIPASE 3539* 5269*  --   --   --   --    GLUCOSE 158* 208*   < > 134* 162* 125*    < > = values in this interval not displayed.     Recent Labs     06/15/20  1143 06/16/20  0605   WBC 14.4* 8.2   NEUTSPOLYS  --  50   LYMPHOCYTES  --  13*   MONOCYTES  --  6   ASTSGOT 83* 65*   ALTSGPT 94* 48   ALKPHOSPHAT 133* 67   TBILIRUBIN 2.4* 1.8*     Recent Labs     06/15/20  1143 06/16/20  0605   RBC 5.43 6.13*   HEMOGLOBIN 18.5* 20.4*   HEMATOCRIT 51.6 59.5*   PLATELETCT 196 175       Imaging  CT:    Reviewed    Assessment/Plan  Acute pancreatitis- (present on admission)  Assessment & Plan  Likely from TG and alcohol  Continue insulin/D10 for treatment of TG  Likely not require a pheresis however nephrology has been consulted  Pain control  Will need to initiate enteral feeds as early as possible via feeding tube at level of jejunum  Monitor for evidence of abdominal compartment syndrome  Change infusions to max 200 to 250 cc/h-becoming volume overloaded  Hemoconcentration improving with previous volume resuscitation    Hypocalcemia  Assessment & Plan  Likely 2/2 to pancreatitis  Monitor  Replete per protocol    Hypertriglyceridemia  Assessment & Plan  Familial?  Monitor and tx per above  May benefit from apheresis if not improving with medical management  Fenofibrate when clinically appropriate    Alcohol use- (present on admission)  Assessment & Plan  Note near daily beer consumption  Monitor for W/ds  Precedex with active titration for withdrawal    Hyponatremia- (present on admission)  Assessment & Plan  Likely from elevated  TG  Monitor    Leukocytosis- (present on admission)  Assessment & Plan  Now with significant bandemia  [place] empiric antibiotic regimen monitor closely for de-escalation as well as discontinuance  Cultures    Tobacco abuse  Assessment & Plan  Counseling when clinically appropriate       VTE:  Lovenox  Ulcer: H2 Antagonist  Lines: None    I have performed a physical exam and reviewed and updated ROS and Plan today (6/17/2020). In review of yesterday's note (6/16/2020), there are no changes except as documented above.     Discussed patient condition and risk of morbidity and/or mortality with RN, RT, Pharmacy, Patient and GI  The patient remains critically ill.  Critical care time = 78 minutes in directly providing and coordinating critical care and extensive data review.  No time overlap and excludes procedures.

## 2020-06-17 NOTE — CONSULTS
Critical Care Consultation    Date of consult: 6/16/2020    Referring Physician  Paige Llanes M.D.    Reason for Consultation  OSH tx for acute pancreatitis    History of Presenting Illness  34 y.o. male who presented 6/16/2020 as a tx from an OSH for acute pancreatitis. Symptoms of back N&V and abdominal pain started 6/15. He was admitted to the OSH and worked up and found to have elevated triglycerides. It was suspected that his pancreatitis was related to elevated triglycerides and alcohol. MRCP was performed to evaluate for obstruction but the CBD was not visualized. He was started on an insulin gtt at the OSH hospital and transferred in the event that he may need plasmapheresis. On arrival he is tachycardic with MAPS > 65 on room air, complaining of mild abdominal pain. Neph and Gi were contacted for consultation prior to arrival    Code Status  Prior    Review of Systems  Review of Systems   Constitutional: Positive for malaise/fatigue. Negative for chills, fever and weight loss.   HENT: Negative.    Eyes: Negative.    Respiratory: Positive for shortness of breath. Negative for cough.    Cardiovascular: Negative for chest pain.   Gastrointestinal: Positive for abdominal pain, constipation and nausea. Negative for vomiting.   Genitourinary: Negative.    Musculoskeletal: Negative.    Skin: Negative.    Neurological: Negative.    Endo/Heme/Allergies: Negative.    Psychiatric/Behavioral: Negative.        Past Medical History   has a past medical history of Sinus congestion.    Surgical History   has no past surgical history on file.    Family History  family history is not on file.    Social History   reports that he has been smoking. He has never used smokeless tobacco. He reports previous alcohol use. He reports that he does not use drugs.    Medications  Home Medications    **Home medications have not yet been reviewed for this encounter**       Current Facility-Administered Medications   Medication Dose  Route Frequency Provider Last Rate Last Dose   • LACTATED RINGERS IV SOLN            • lactated ringer BOLUS infusion  1,000 mL Intravenous Once Carter Ferrer M.D.       • insulin regular (HUMULIN R) injection 0-14 Units  0-14 Units Intravenous Once Carter Ferrer M.D.       • insulin regular human (HUMULIN/NOVOLIN R) 62.5 Units in  mL infusion per protocol  0-29 Units/hr Intravenous Continuous Crater Ferrer M.D.       • dextrose 50% (D50W) injection 25-50 mL  25-50 mL Intravenous PRN Carter Ferrer M.D.           Allergies  No Known Allergies    Vital Signs last 24 hours       Physical Exam  Physical Exam  Constitutional:       General: He is not in acute distress.     Appearance: He is obese. He is ill-appearing. He is not toxic-appearing.   HENT:      Head: Normocephalic and atraumatic.      Right Ear: External ear normal.      Left Ear: External ear normal.      Nose: Nose normal.      Mouth/Throat:      Mouth: Mucous membranes are dry.      Pharynx: Oropharynx is clear.   Eyes:      General: No scleral icterus.     Extraocular Movements: Extraocular movements intact.      Conjunctiva/sclera: Conjunctivae normal.      Pupils: Pupils are equal, round, and reactive to light.   Neck:      Musculoskeletal: Normal range of motion.   Cardiovascular:      Rate and Rhythm: Regular rhythm. Tachycardia present.      Pulses: Normal pulses.   Pulmonary:      Effort: Pulmonary effort is normal. No respiratory distress.   Abdominal:      General: Bowel sounds are normal.      Tenderness: There is abdominal tenderness. There is no guarding.   Musculoskeletal:         General: No swelling.   Skin:     General: Skin is warm and dry.      Capillary Refill: Capillary refill takes 2 to 3 seconds.      Coloration: Skin is not jaundiced.   Neurological:      General: No focal deficit present.      Mental Status: He is alert and oriented to person, place, and time. Mental status is at baseline.      Cranial Nerves: No  cranial nerve deficit.      Motor: No weakness.   Psychiatric:         Mood and Affect: Mood normal.         Behavior: Behavior normal.         Fluids  No intake or output data in the 24 hours ending 06/16/20 2232    Laboratory  Recent Results (from the past 48 hour(s))   CBC WITH DIFFERENTIAL    Collection Time: 06/15/20 11:43 AM   Result Value Ref Range    WBC 14.4 (H) 4.8 - 10.8 K/uL    RBC 5.43 4.70 - 6.10 M/uL    Hemoglobin 18.5 (H) 14.0 - 18.0 g/dL    Hematocrit 51.6 42.0 - 52.0 %    MCV 95.0 (H) 80.0 - 94.0 fL    MCH 34.1 (H) 28.7 - 33.1 pg    MCHC 35.9 33.0 - 37.0 g/dL    RDW 14.5 11.5 - 14.5 %    Platelet Count 196 130 - 400 K/uL    MPV 9.8 7.4 - 10.4 fL    Neutrophils Automated 85.5 (H) 39.0 - 70.0 %    Lymphocytes Automated 7.5 (L) 21.0 - 50.0 %    Monocytes Automated 5.7 1.7 - 10.0 %    Eosinophils Automated 0.9 0.0 - 5.0 %    Basophils Automated 0.4 0.0 - 3.0 %    Abs Neutrophils Automated 12.3 (H) 1.8 - 7.7 K/uL    Abs Lymph Automated 1.1 (L) 1.2 - 4.8 K/uL    Monos (Absolute) 0.8 0.2 - 0.9 K/uL   COMP METABOLIC PANEL    Collection Time: 06/15/20 11:43 AM   Result Value Ref Range    Sodium 136 (L) 137 - 145 mmol/L    Potassium 4.3 3.5 - 5.1 mmol/L    Chloride 99 98 - 107 mmol/L    Co2 20 (L) 22 - 30 mmol/L    Anion Gap 17 (H) 4 - 12 mmol/L    Glucose 158 (H) 70 - 100 mg/dL    Bun 15 9 - 20 mg/dL    Creatinine 0.8 0.7 - 1.3 mg/dL    Calcium 8.8 8.7 - 10.5 mg/dL    AST(SGOT) 83 (H) 15 - 46 U/L    ALT(SGPT) 94 (H) 13 - 69 U/L    Alkaline Phosphatase 133 (H) 38 - 126 U/L    Total Bilirubin 2.4 (H) 0.2 - 1.3 mg/dL    Albumin 4.6 3.5 - 5.0 g/dL    Total Protein 9.0 (H) 6.3 - 8.2 g/dL    A-G Ratio 1.0    LIPASE    Collection Time: 06/15/20 11:43 AM   Result Value Ref Range    Lipase 3539 (H) 23 - 300 U/L   ESTIMATED GFR    Collection Time: 06/15/20 11:43 AM   Result Value Ref Range    GFR If African American >60 >60 mL/min/1.73 m 2    GFR If Non African American >60 >60 mL/min/1.73 m 2   Magnesium     Collection Time: 06/15/20 11:43 AM   Result Value Ref Range    Magnesium 2.0 1.6 - 2.3 mg/dL   BILIRUBIN DIRECT    Collection Time: 06/15/20 11:43 AM   Result Value Ref Range    Direct Bilirubin 1.1 (H) 0.0 - 0.2 mg/dL   POC URINE DIPSTICK    Collection Time: 06/15/20 11:45 AM   Result Value Ref Range    POC Color orange Negative    POC Appearance cloudy Negative    POC Glucose neg Negative mg/dL    POC Ketones neg Negative mg/dL    POC Specific Gravity >=1.030 <1.005 - >1.030    POC Blood moderate Negative    POC Urine PH 6.5 5.0 - 8.0    POC Protein 100 Negative mg/dL    POC Leukocyte Esterase neg Negative    POC Bilirubin small Negative mg/dL    POC Urobiligen 1.0 Negative (0.2) mg/dL    POC Nitrites neg Negative   URINALYSIS CULTURE, IF INDICATED    Collection Time: 06/15/20 11:45 AM    Specimen: Urine   Result Value Ref Range    Color YELLOW     Character CLEAR     Specific Gravity >=1.030 (A) <1.035    Ph 6.5 5.0 - 8.0    Glucose NEGATIVE Negative mg/dL    Ketones NEGATIVE Negative mg/dL    Protein 30 (A) Negative mg/dL    Bilirubin NEGATIVE Negative    Urobilinogen, Urine 0.2 Negative    Nitrite NEGATIVE Negative    Leukocyte Esterase NEGATIVE Negative    Occult Blood MODERATE (A) Negative    Culture Indicated Yes UA Culture   URINE MICROSCOPIC (W/UA)    Collection Time: 06/15/20 11:45 AM   Result Value Ref Range    WBC Rare 0 - 6 /hpf    RBC 6-10 0 - 3 /hpf    Bacteria 6-25 (A) None /hpf    Epithelial Cells Rare /hpf    Mucous Threads 2+ /hpf    Urine Other See Below    URINE CULTURE(NEW)    Collection Time: 06/15/20 11:45 AM    Specimen: Urine   Result Value Ref Range    Significant Indicator NEG     Source UR     Site Voided     Urine Culture No growth    MRSA SURVEILLANCE    Collection Time: 06/15/20  1:25 PM   Result Value Ref Range    Mrsa Screen Negative    CBC with Differential    Collection Time: 06/16/20  6:05 AM   Result Value Ref Range    WBC 8.2 4.8 - 10.8 K/uL    RBC 6.13 (H) 4.70 - 6.10 M/uL     Hemoglobin 20.4 (H) 14.0 - 18.0 g/dL    Hematocrit 59.5 (H) 42.0 - 52.0 %    MCV 97.1 (H) 80.0 - 94.0 fL    MCH 33.3 (H) 28.7 - 33.1 pg    MCHC 34.3 33.0 - 37.0 g/dL    RDW 16.1 (H) 11.5 - 14.5 %    Platelet Count 175 130 - 400 K/uL    MPV 9.8 7.4 - 10.4 fL    Neutrophils Automated 76.1 (H) 39.0 - 70.0 %    Neutrophils-Polys 50 39 - 70 %    Lymphocytes Automated 15.6 (L) 21.0 - 50.0 %    Lymphocytes 13 (L) 21 - 50 %    Monocytes Automated 7.6 1.7 - 10.0 %    Monocytes 6 2 - 9 %    Eosinophils Automated 0.6 0.0 - 5.0 %    Basophils Automated 0.1 0.0 - 3.0 %    Abs Neutrophils Automated 6.2 1.8 - 7.7 K/uL    Neutrophils (Absolute) 6.2 1.8 - 7.7 K/uL    Abs Lymph Automated 1.3 1.2 - 4.8 K/uL    Lymphs (Absolute) 1.5 1.2 - 4.8 K/uL    Monos (Absolute) 0.6 0.2 - 0.9 K/uL    Anisocytosis 1+    Comp Metabolic Panel (CMP)    Collection Time: 06/16/20  6:05 AM   Result Value Ref Range    Sodium 131 (L) 137 - 145 mmol/L    Potassium 4.7 3.5 - 5.1 mmol/L    Chloride 106 98 - 107 mmol/L    Co2 13 (L) 22 - 30 mmol/L    Anion Gap 12 4 - 12 mmol/L    Glucose 208 (H) 70 - 100 mg/dL    Bun 25 (H) 9 - 20 mg/dL    Creatinine 1.3 0.7 - 1.3 mg/dL    Calcium 6.8 (L) 8.7 - 10.5 mg/dL    AST(SGOT) 65 (H) 15 - 46 U/L    ALT(SGPT) 48 13 - 69 U/L    Alkaline Phosphatase 67 38 - 126 U/L    Total Bilirubin 1.8 (H) 0.2 - 1.3 mg/dL    Albumin 3.1 (L) 3.5 - 5.0 g/dL    Total Protein 6.1 (L) 6.3 - 8.2 g/dL    A-G Ratio 1.1    LIPASE    Collection Time: 06/16/20  6:05 AM   Result Value Ref Range    Lipase 5269 (H) 23 - 300 U/L   Lipid Profile    Collection Time: 06/16/20  6:05 AM   Result Value Ref Range    Cholesterol,Tot 259 (H) 0 - 200 mg/dL    Triglycerides 1307 (H) 35 - 150 mg/dL    LDL <30 <100 mg/dL    HDL 25 (L) 40 - 60 mg/dL    Chol-Hdl Ratio 10.36    ESTIMATED GFR    Collection Time: 06/16/20  6:05 AM   Result Value Ref Range    GFR If African American >60 >60 mL/min/1.73 m 2    GFR If Non African American >60 >60 mL/min/1.73 m 2    DIFFERENTIAL MANUAL    Collection Time: 06/16/20  6:05 AM   Result Value Ref Range    Bands-Stabs 26 (H) 0 - 6 %    Plt Estimation Adequate     Reactive Lymphocytes 5 0 - 5 %   HEMOGLOBIN A1C    Collection Time: 06/16/20 10:30 AM   Result Value Ref Range    Glycohemoglobin 5.3 4.0 - 5.6 %    Est Avg Glucose 105 mg/dL   LACTIC ACID    Collection Time: 06/16/20 10:39 AM   Result Value Ref Range    Lactic Acid 4.1 (HH) 0.0 - 2.0 mmol/L   POC GLUCOSE    Collection Time: 06/16/20 11:36 AM   Result Value Ref Range    Glucose - Accu-Ck 194 70 - 100 mg/dL   POC GLUCOSE    Collection Time: 06/16/20 12:00 PM   Result Value Ref Range    Glucose - Accu-Ck 198 (A) 70 - 100 mg/dL   BASIC METABOLIC PANEL    Collection Time: 06/16/20 12:05 PM   Result Value Ref Range    Sodium 132 (L) 137 - 145 mmol/L    Potassium 5.4 (H) 3.5 - 5.1 mmol/L    Chloride 107 98 - 107 mmol/L    Co2 14 (L) 22 - 30 mmol/L    Glucose 170 (H) 70 - 100 mg/dL    Bun 32 (H) 9 - 20 mg/dL    Creatinine 2.1 (H) 0.7 - 1.3 mg/dL    Calcium 7.3 (L) 8.7 - 10.5 mg/dL    Anion Gap 11 4 - 12 mmol/L   ESTIMATED GFR    Collection Time: 06/16/20 12:05 PM   Result Value Ref Range    GFR If  44 (A) >60 mL/min/1.73 m 2    GFR If Non  36 (A) >60 mL/min/1.73 m 2   POC GLUCOSE    Collection Time: 06/16/20  1:00 PM   Result Value Ref Range    Glucose - Accu-Ck 188 (A) 70 - 100 mg/dL   POC GLUCOSE    Collection Time: 06/16/20  2:00 PM   Result Value Ref Range    Glucose - Accu-Ck 168 (A) 70 - 100 mg/dL   POC GLUCOSE    Collection Time: 06/16/20  3:05 PM   Result Value Ref Range    Glucose - Accu-Ck 148 (A) 70 - 100 mg/dL   Basic Metabolic Panel    Collection Time: 06/16/20  4:00 PM   Result Value Ref Range    Sodium 129 (L) 137 - 145 mmol/L    Potassium 4.4 3.5 - 5.1 mmol/L    Chloride 107 98 - 107 mmol/L    Co2 14 (L) 22 - 30 mmol/L    Glucose 134 (H) 70 - 100 mg/dL    Bun 31 (H) 9 - 20 mg/dL    Creatinine 1.5 (H) 0.7 - 1.3 mg/dL    Calcium 6.7  (L) 8.7 - 10.5 mg/dL    Anion Gap 8 4 - 12 mmol/L   LACTIC ACID    Collection Time: 06/16/20  4:00 PM   Result Value Ref Range    Lactic Acid 2.2 (H) 0.0 - 2.0 mmol/L   ESTIMATED GFR    Collection Time: 06/16/20  4:00 PM   Result Value Ref Range    GFR If African American >60 >60 mL/min/1.73 m 2    GFR If Non  53 (A) >60 mL/min/1.73 m 2   POC GLUCOSE    Collection Time: 06/16/20  4:04 PM   Result Value Ref Range    Glucose - Accu-Ck 136 (A) 70 - 100 mg/dL   ARTERIAL BLOOD GAS    Collection Time: 06/16/20  5:40 PM   Result Value Ref Range    Ph 7.28 (L) 7.40 - 7.50    Pco2 30.9 25.0 - 35.0 mmHg    Po2 82.8 (H) 55.0 - 70.0 mmHg    Hco3 14 (L) 20 - 29 mmol/L    Base Excess -11 (L) -3 - 4 mmol/L    O2 Content 25.7 mL/dL    O2 Saturation 95.4 (H) 89.0 - 94.0 %    Anion Gap 16 10 - 18 mmol/L    FIO2 60     Minute Volume 8 /L    PAO2/FIO2 Ratio 138    POC GLUCOSE    Collection Time: 06/16/20  5:58 PM   Result Value Ref Range    Glucose - Accu-Ck 175 (A) 70 - 100 mg/dL   POC GLUCOSE    Collection Time: 06/16/20  7:00 PM   Result Value Ref Range    Glucose - Accu-Ck 158 (A) 70 - 100 mg/dL   POC GLUCOSE    Collection Time: 06/16/20  7:56 PM   Result Value Ref Range    Glucose - Accu-Ck 167 (A) 70 - 100 mg/dL   POC GLUCOSE    Collection Time: 06/16/20  9:01 PM   Result Value Ref Range    Glucose - Accu-Ck 182 (A) 70 - 100 mg/dL       Imaging  No orders to display       Assessment/Plan  Acute pancreatitis- (present on admission)  Assessment & Plan  Likely from TG and alcohol  Continue supportive care  IVF  Antiemetics and analgesics PRN  Insulin gtt 180 protocol   NPO  GI and Neph consulted for consideration of apheresis  Repeat TG levels now goal < 500    Hypocalcemia  Assessment & Plan  Likely 2/2 to pancreatitis  Monitor  Replete per protocol    Hypertriglyceridemia  Assessment & Plan  Familial?  Monitor and tx per above    Alcohol use- (present on admission)  Assessment & Plan  Note near daily beer  consumption  Monitor for W/ds  Precedex and low dose BDZs if needed    Hyponatremia- (present on admission)  Assessment & Plan  Likely from elevated TG  Monitor    Leukocytosis- (present on admission)  Assessment & Plan  No overt signs of infection  Blood cx at OSH NGTD  Monitor  Hold on antimicrobials for now      Discussed patient condition and risk of morbidity and/or mortality with Hospitalist, RN, Pharmacy, Charge nurse / hot rounds and Patient.    The patient remains critically ill.  Critical care time = 38 minutes in directly providing and coordinating critical care and extensive data review.  No time overlap and excludes procedures.

## 2020-06-17 NOTE — CONSULTS
"                                             Gastroenterology Consult Note     Date of Consult: 06/16/2020  Referring Physician: Shraddha     Reason for consult: acute pancreatitis        HPI: This is a 33 yo male with no significant past medical history who presented in transfer from [place] due to severe acute pancreatitis. The patient was admitted yesterday at the OSH. He says that 2 nights ago he awoke at 930 with severe abdominal pain. He says that he had pain in the mid abdomen that would radiate to the LLQ and RUQ as well as to the back. The pain was very intense and stabbing. He says at the highest level his pain was an \"11\" out of 10. He reports some nausea with this. He says that he has never had pain like this before. He denies heartburn, GERD, constipation, diarrhea. He denies melena or hematochezia. He says that he was drinking ETOH more the night when the pain started. He was at a party and had 5-6 beers and multiple shots of liquor. On a usual night he has to pints of 805 and will also have some Tequila or whiskey. He reports no family history of pancreatitis.    The patient was ultimately transferred to [facility] due to worrisome vital signs and also because his TG level was found to be severely elevated and there was concern for the need for plasmapheresis.      PMHX:  Past Medical History:   Diagnosis Date   • Sinus congestion           PSurgHx: none     ALLERGIES:Patient has no known allergies.     SocHx: ETOH use is noted as above. He uses MJ regularly  Social History     Socioeconomic History   • Marital status: Single     Spouse name: Not on file   • Number of children: Not on file   • Years of education: Not on file   • Highest education level: Not on file   Occupational History   • Not on file   Social Needs   • Financial resource strain: Not on file   • Food insecurity     Worry: Not on file     Inability: Not on file   • Transportation needs     Medical: Not on file     Non-medical: Not on file "   Tobacco Use   • Smoking status: Current Every Day Smoker   • Smokeless tobacco: Never Used   Substance and Sexual Activity   • Alcohol use: Not Currently     Frequency: 2-4 times a month   • Drug use: Never     Comment: mj   • Sexual activity: Not on file   Lifestyle   • Physical activity     Days per week: Not on file     Minutes per session: Not on file   • Stress: Not on file   Relationships   • Social connections     Talks on phone: Not on file     Gets together: Not on file     Attends Yazidism service: Not on file     Active member of club or organization: Not on file     Attends meetings of clubs or organizations: Not on file     Relationship status: Not on file   • Intimate partner violence     Fear of current or ex partner: Not on file     Emotionally abused: Not on file     Physically abused: Not on file     Forced sexual activity: Not on file   Other Topics Concern   • Not on file   Social History Narrative   • Not on file        FAMHx: denies family history of high TGs or pancreatitis     ROS:  Constitutional: No fevers, chills, no night sweats, no weight changes  HEENT: no vision or hearing changes, no dry mouth, no change in smell  CARDIO: no palpitations, no orthopnea, no chest pain  PULM: no cough, no shortness of breath  NEURO: no Seizures, no memory impairment, no change in sensation  GI: as above  : no dysuria, no hematuria  HEME: no anemia, no easy brusing  MUSCULOSKELETAL: no muscle aches, no back pain, no arthritis  PSYCH: no anxiety or depression  SKIN: no rashes     PE:  Vitals:    06/16/20 2215 06/16/20 2243   BP:  126/103   Pulse: (!) 131 (!) 132   Resp: (!) 24 (!) 26   Temp: 36.8 °C (98.3 °F)    TempSrc: Temporal    SpO2:  92%     Gen: AAOx3, uncomfortable appearing, lying in bed  HEENT: PERRL, EOMI, nares patent, Mucous membranes dry  Neck: supple, no cervical or supraclavicular adenopathy  CVS: regular rhythm, tachycardic, no MRG  Pulm: CTAB, no crackles  Abd: diminished bowel  sounds, diffusely TTP with guarding. Mildly distended. No mass is palpated  Ext: no edema, normal sensation  NEURO: grossly normal, no weakness  Skin: warm, no rash  Psych: normal Affect, no anxiety     LABS:  Lab Results   Component Value Date/Time    SODIUM 129 (L) 06/16/2020 04:00 PM    POTASSIUM 4.4 06/16/2020 04:00 PM    CHLORIDE 107 06/16/2020 04:00 PM    CO2 14 (L) 06/16/2020 04:00 PM    GLUCOSE 134 (H) 06/16/2020 04:00 PM    BUN 31 (H) 06/16/2020 04:00 PM    CREATININE 1.5 (H) 06/16/2020 04:00 PM      Lab Results   Component Value Date/Time    WBC 8.2 06/16/2020 06:05 AM    RBC 6.13 (H) 06/16/2020 06:05 AM    HEMOGLOBIN 20.4 (H) 06/16/2020 06:05 AM    HEMATOCRIT 59.5 (H) 06/16/2020 06:05 AM    MCV 97.1 (H) 06/16/2020 06:05 AM    MCH 33.3 (H) 06/16/2020 06:05 AM    MCHC 34.3 06/16/2020 06:05 AM    MPV 9.8 06/16/2020 06:05 AM    NEUTSPOLYS 50 06/16/2020 06:05 AM    LYMPHOCYTES 13 (L) 06/16/2020 06:05 AM    MONOCYTES 6 06/16/2020 06:05 AM    ANISOCYTOSIS 1+ 06/16/2020 06:05 AM        No results found for: PROTHROMBTM, INR   Recent Labs     06/15/20  1143 06/16/20  0605   ASTSGOT 83* 65*   ALTSGPT 94* 48   TBILIRUBIN 2.4* 1.8*   DBILIRUBIN 1.1*  --           Problem List Items Addressed This Visit     None           ASSESSMENT: 35 yo male with acute pancreatitis due to combination of ETOH and hypertryglyceridemia. The patient reports excessive ETOH use prior to the pain starting. This was likely the inciting factor. His TGs are very high and this might also predispose him to pancreatitis. Supportive care with IVF and pain control followed by apheresis and Insulin should be the intial goals of care     PLAN:   1) Agree with Insulin drip for now  2) consult Nephrology for apheresis in AM  3) continue volume resuscitation. The patient has very worrisome vital signs. He needs additional volume. He is getting one liter of LR now but should be placed on LR at 200cc/hr as well  4) monitor pain and consider f/u CT if  continued worsening decline  5) Plan for fibrate therapy to help control TGs at discharge    We will follow along. Thank you for this consult.     Brad Sanchez MD

## 2020-06-17 NOTE — PROGRESS NOTES
Received inpatient to inpatient transfer request from [facility]   Sending Physician: Zaid   Specialist consulted: Nabil Valdez   Diagnosis Pancreatitis   Patient Accepted by: Dr. Llanes     Patient coming via: TBD  ETA: TBD   Nursing to notify Direct Admit On-Call hospitalist when patient arrives

## 2020-06-17 NOTE — PROGRESS NOTES
Transfer from Dr Mar at [facility] for GI, Nephro consult / plasma exchange    34M, PMHx alcohol dependence. Is in ICU for insulin drip and increasing pain requirement and sedation / lethargy from hydromorphone, got CPAP, has not been intubated.      Thought to have hypertriglyceridemia and alcoholic pancreatitis to consider plasma exchange     Not improving IV insulin trail, Nephro Dr Martin will consult and do plasma exchanged if needed    Dr Sanchez from [facility] will consult.     Dr eFrrer from intenstive care agrees to consult     Please alert on call hospitals, Nephro, GI, & intensive care specialist on patient's arrival     Inpatient ICU bed

## 2020-06-17 NOTE — CARE PLAN
Problem: Pain Management  Goal: Pain level will decrease to patient's comfort goal  Outcome: PROGRESSING SLOWER THAN EXPECTED  Intervention: Educate and implement non-pharmacologic comfort measures. Examples: relaxation, distration, play therapy, activity therapy, massage, etc.  Note: Fentantl gtt started due to uncontrolled pain. Pt educated of likely mir of pain persisting due to pancreatitis however, desire is to keep pt relatively comfortable. Intermittent sleep since starting gtt.      Problem: Respiratory:  Goal: Respiratory status will improve  Outcome: PROGRESSING SLOWER THAN EXPECTED  Intervention: Assess and monitor pulmonary status  Note: Remains on supplemental O2. Have not had to titrate despite starting fentanyl gtt. 5L NC. Lungs clear with diminished bases.

## 2020-06-17 NOTE — PROGRESS NOTES
Assumed care of patient, patient Q30 min fingersticks, Tachyonic RR 40, /123, Fever of 100.3 F. Updated MD, new orders received.

## 2020-06-17 NOTE — ASSESSMENT & PLAN NOTE
Multifactorial, suspect familial hypertriglyceridemia worsened by alcohol intake.  Patient will continue to be strictly n.p.o. and maintained on D5 NS with sliding scale insulin.  Continue IV fluid hydration and symptomatic management for pain.  Monitor LFTs and avoid hepatotoxic medications.  GI following for consideration of plasmapheresis.

## 2020-06-17 NOTE — PROGRESS NOTES
Cortrak Placement    Tube Team verified patient name and medical record number prior to tube placement.  Cortrak tube (43 inches, 12 Armenian) placed at 92 cm in right nare.  Per Cortrak picture, tube appears to be in the small bowel.  Nursing Instructions: Awaiting KUB to confirm placement before use for medications or feeding. Once placement confirmed, flush tube with 30 ml of water, and then remove and save stylet, in patient medication drawer.

## 2020-06-18 PROBLEM — I10 HYPERTENSION: Status: ACTIVE | Noted: 2020-06-18

## 2020-06-18 LAB
ALBUMIN SERPL BCP-MCNC: 2.4 G/DL (ref 3.2–4.9)
ALBUMIN/GLOB SERPL: 0.8 G/DL
ALP SERPL-CCNC: 45 U/L (ref 30–99)
ALT SERPL-CCNC: 25 U/L (ref 2–50)
ANION GAP SERPL CALC-SCNC: 7 MMOL/L (ref 7–16)
AST SERPL-CCNC: 46 U/L (ref 12–45)
BASOPHILS # BLD AUTO: 0 % (ref 0–1.8)
BASOPHILS # BLD: 0 K/UL (ref 0–0.12)
BILIRUB SERPL-MCNC: 1.4 MG/DL (ref 0.1–1.5)
BUN SERPL-MCNC: 9 MG/DL (ref 8–22)
CA-I SERPL-SCNC: 1.1 MMOL/L (ref 1.1–1.3)
CALCIUM SERPL-MCNC: 7.3 MG/DL (ref 8.5–10.5)
CHLORIDE SERPL-SCNC: 100 MMOL/L (ref 96–112)
CO2 SERPL-SCNC: 21 MMOL/L (ref 20–33)
CREAT SERPL-MCNC: 0.59 MG/DL (ref 0.5–1.4)
CRP SERPL HS-MCNC: 24.51 MG/DL (ref 0–0.75)
EOSINOPHIL # BLD AUTO: 0.13 K/UL (ref 0–0.51)
EOSINOPHIL NFR BLD: 1.7 % (ref 0–6.9)
ERYTHROCYTE [DISTWIDTH] IN BLOOD BY AUTOMATED COUNT: 55.6 FL (ref 35.9–50)
GLOBULIN SER CALC-MCNC: 3.2 G/DL (ref 1.9–3.5)
GLUCOSE BLD-MCNC: 105 MG/DL (ref 65–99)
GLUCOSE BLD-MCNC: 110 MG/DL (ref 65–99)
GLUCOSE BLD-MCNC: 113 MG/DL (ref 65–99)
GLUCOSE BLD-MCNC: 116 MG/DL (ref 65–99)
GLUCOSE BLD-MCNC: 118 MG/DL (ref 65–99)
GLUCOSE BLD-MCNC: 120 MG/DL (ref 65–99)
GLUCOSE BLD-MCNC: 124 MG/DL (ref 65–99)
GLUCOSE BLD-MCNC: 126 MG/DL (ref 65–99)
GLUCOSE BLD-MCNC: 127 MG/DL (ref 65–99)
GLUCOSE BLD-MCNC: 128 MG/DL (ref 65–99)
GLUCOSE BLD-MCNC: 128 MG/DL (ref 65–99)
GLUCOSE BLD-MCNC: 132 MG/DL (ref 65–99)
GLUCOSE BLD-MCNC: 132 MG/DL (ref 65–99)
GLUCOSE BLD-MCNC: 134 MG/DL (ref 65–99)
GLUCOSE BLD-MCNC: 149 MG/DL (ref 65–99)
GLUCOSE BLD-MCNC: 153 MG/DL (ref 65–99)
GLUCOSE BLD-MCNC: 155 MG/DL (ref 65–99)
GLUCOSE BLD-MCNC: 159 MG/DL (ref 65–99)
GLUCOSE BLD-MCNC: 173 MG/DL (ref 65–99)
GLUCOSE BLD-MCNC: 178 MG/DL (ref 65–99)
GLUCOSE BLD-MCNC: 180 MG/DL (ref 65–99)
GLUCOSE SERPL-MCNC: 137 MG/DL (ref 65–99)
HCT VFR BLD AUTO: 38.2 % (ref 42–52)
HGB BLD-MCNC: 12.6 G/DL (ref 14–18)
LIPASE SERPL-CCNC: 420 U/L (ref 11–82)
LYMPHOCYTES # BLD AUTO: 1.11 K/UL (ref 1–4.8)
LYMPHOCYTES NFR BLD: 14.8 % (ref 22–41)
MAGNESIUM SERPL-MCNC: 2.2 MG/DL (ref 1.5–2.5)
MANUAL DIFF BLD: NORMAL
MCH RBC QN AUTO: 33.3 PG (ref 27–33)
MCHC RBC AUTO-ENTMCNC: 33 G/DL (ref 33.7–35.3)
MCV RBC AUTO: 101.1 FL (ref 81.4–97.8)
MONOCYTES # BLD AUTO: 0.92 K/UL (ref 0–0.85)
MONOCYTES NFR BLD AUTO: 12.2 % (ref 0–13.4)
MORPHOLOGY BLD-IMP: NORMAL
NEUTROPHILS # BLD AUTO: 5.35 K/UL (ref 1.82–7.42)
NEUTROPHILS NFR BLD: 66.1 % (ref 44–72)
NEUTS BAND NFR BLD MANUAL: 5.2 % (ref 0–10)
NRBC # BLD AUTO: 0 K/UL
NRBC BLD-RTO: 0 /100 WBC
PLATELET # BLD AUTO: 112 K/UL (ref 164–446)
PLATELET BLD QL SMEAR: NORMAL
PMV BLD AUTO: 9.7 FL (ref 9–12.9)
POTASSIUM SERPL-SCNC: 3.4 MMOL/L (ref 3.6–5.5)
POTASSIUM SERPL-SCNC: 3.6 MMOL/L (ref 3.6–5.5)
POTASSIUM SERPL-SCNC: 3.6 MMOL/L (ref 3.6–5.5)
POTASSIUM SERPL-SCNC: 3.7 MMOL/L (ref 3.6–5.5)
POTASSIUM SERPL-SCNC: 3.8 MMOL/L (ref 3.6–5.5)
PREALB SERPL-MCNC: 10 MG/DL (ref 18–38)
PROT SERPL-MCNC: 5.6 G/DL (ref 6–8.2)
RBC # BLD AUTO: 3.78 M/UL (ref 4.7–6.1)
SODIUM SERPL-SCNC: 128 MMOL/L (ref 135–145)
TRIGL SERPL-MCNC: 370 MG/DL (ref 0–149)
WBC # BLD AUTO: 7.5 K/UL (ref 4.8–10.8)

## 2020-06-18 PROCEDURE — 770022 HCHG ROOM/CARE - ICU (200)

## 2020-06-18 PROCEDURE — 84478 ASSAY OF TRIGLYCERIDES: CPT

## 2020-06-18 PROCEDURE — 700105 HCHG RX REV CODE 258: Performed by: INTERNAL MEDICINE

## 2020-06-18 PROCEDURE — 700111 HCHG RX REV CODE 636 W/ 250 OVERRIDE (IP): Performed by: INTERNAL MEDICINE

## 2020-06-18 PROCEDURE — 700101 HCHG RX REV CODE 250: Performed by: INTERNAL MEDICINE

## 2020-06-18 PROCEDURE — 99291 CRITICAL CARE FIRST HOUR: CPT | Performed by: INTERNAL MEDICINE

## 2020-06-18 PROCEDURE — 85027 COMPLETE CBC AUTOMATED: CPT

## 2020-06-18 PROCEDURE — 83690 ASSAY OF LIPASE: CPT

## 2020-06-18 PROCEDURE — 82330 ASSAY OF CALCIUM: CPT

## 2020-06-18 PROCEDURE — 80053 COMPREHEN METABOLIC PANEL: CPT

## 2020-06-18 PROCEDURE — 86140 C-REACTIVE PROTEIN: CPT

## 2020-06-18 PROCEDURE — 700111 HCHG RX REV CODE 636 W/ 250 OVERRIDE (IP): Performed by: PSYCHIATRY & NEUROLOGY

## 2020-06-18 PROCEDURE — 85007 BL SMEAR W/DIFF WBC COUNT: CPT

## 2020-06-18 PROCEDURE — 700102 HCHG RX REV CODE 250 W/ 637 OVERRIDE(OP): Performed by: INTERNAL MEDICINE

## 2020-06-18 PROCEDURE — 306456 ABVISER MONITORING SYSTEM: Performed by: INTERNAL MEDICINE

## 2020-06-18 PROCEDURE — 84132 ASSAY OF SERUM POTASSIUM: CPT

## 2020-06-18 PROCEDURE — 700111 HCHG RX REV CODE 636 W/ 250 OVERRIDE (IP): Performed by: HOSPITALIST

## 2020-06-18 PROCEDURE — A9270 NON-COVERED ITEM OR SERVICE: HCPCS | Performed by: HOSPITALIST

## 2020-06-18 PROCEDURE — 84134 ASSAY OF PREALBUMIN: CPT

## 2020-06-18 PROCEDURE — 700102 HCHG RX REV CODE 250 W/ 637 OVERRIDE(OP): Performed by: HOSPITALIST

## 2020-06-18 PROCEDURE — 82962 GLUCOSE BLOOD TEST: CPT | Mod: 91

## 2020-06-18 PROCEDURE — 83735 ASSAY OF MAGNESIUM: CPT

## 2020-06-18 PROCEDURE — A9270 NON-COVERED ITEM OR SERVICE: HCPCS | Performed by: INTERNAL MEDICINE

## 2020-06-18 PROCEDURE — 306456 ABVISER MONITORING SYSTEM: Performed by: HOSPITALIST

## 2020-06-18 RX ORDER — OXYCODONE HCL 5 MG/5 ML
5 SOLUTION, ORAL ORAL EVERY 4 HOURS PRN
Status: DISCONTINUED | OUTPATIENT
Start: 2020-06-18 | End: 2020-06-18

## 2020-06-18 RX ORDER — POTASSIUM CHLORIDE 7.45 MG/ML
10 INJECTION INTRAVENOUS
Status: COMPLETED | OUTPATIENT
Start: 2020-06-18 | End: 2020-06-19

## 2020-06-18 RX ORDER — FUROSEMIDE 10 MG/ML
40 INJECTION INTRAMUSCULAR; INTRAVENOUS ONCE
Status: COMPLETED | OUTPATIENT
Start: 2020-06-18 | End: 2020-06-18

## 2020-06-18 RX ORDER — OXYCODONE HYDROCHLORIDE 5 MG/1
5 TABLET ORAL EVERY 4 HOURS PRN
Status: DISCONTINUED | OUTPATIENT
Start: 2020-06-18 | End: 2020-06-19

## 2020-06-18 RX ORDER — SODIUM CHLORIDE, SODIUM LACTATE, POTASSIUM CHLORIDE, CALCIUM CHLORIDE 600; 310; 30; 20 MG/100ML; MG/100ML; MG/100ML; MG/100ML
INJECTION, SOLUTION INTRAVENOUS CONTINUOUS
Status: DISCONTINUED | OUTPATIENT
Start: 2020-06-18 | End: 2020-06-19

## 2020-06-18 RX ORDER — POTASSIUM CHLORIDE 7.45 MG/ML
10 INJECTION INTRAVENOUS
Status: COMPLETED | OUTPATIENT
Start: 2020-06-18 | End: 2020-06-18

## 2020-06-18 RX ORDER — AMLODIPINE BESYLATE 10 MG/1
10 TABLET ORAL
Status: DISCONTINUED | OUTPATIENT
Start: 2020-06-18 | End: 2020-06-20 | Stop reason: HOSPADM

## 2020-06-18 RX ORDER — DEXTROSE AND SODIUM CHLORIDE 5; .45 G/100ML; G/100ML
INJECTION, SOLUTION INTRAVENOUS
Status: DISCONTINUED
Start: 2020-06-18 | End: 2020-06-18

## 2020-06-18 RX ORDER — DEXMEDETOMIDINE HYDROCHLORIDE 4 UG/ML
.1-1.5 INJECTION, SOLUTION INTRAVENOUS CONTINUOUS
Status: DISCONTINUED | OUTPATIENT
Start: 2020-06-18 | End: 2020-06-20

## 2020-06-18 RX ADMIN — POTASSIUM CHLORIDE 10 MEQ: 7.46 INJECTION, SOLUTION INTRAVENOUS at 12:12

## 2020-06-18 RX ADMIN — POTASSIUM CHLORIDE 10 MEQ: 7.46 INJECTION, SOLUTION INTRAVENOUS at 03:31

## 2020-06-18 RX ADMIN — OXYCODONE 5 MG: 5 TABLET ORAL at 20:07

## 2020-06-18 RX ADMIN — ENOXAPARIN SODIUM 40 MG: 40 INJECTION SUBCUTANEOUS at 05:13

## 2020-06-18 RX ADMIN — DEXTROSE AND SODIUM CHLORIDE 1000 ML: 10; .45 INJECTION, SOLUTION INTRAVENOUS at 04:00

## 2020-06-18 RX ADMIN — SODIUM CHLORIDE 5 MG/HR: 9 INJECTION, SOLUTION INTRAVENOUS at 00:00

## 2020-06-18 RX ADMIN — POTASSIUM CHLORIDE 10 MEQ: 10 INJECTION, SOLUTION INTRAVENOUS at 17:15

## 2020-06-18 RX ADMIN — POTASSIUM CHLORIDE 10 MEQ: 7.46 INJECTION, SOLUTION INTRAVENOUS at 01:13

## 2020-06-18 RX ADMIN — DEXMEDETOMIDINE HYDROCHLORIDE 1 MCG/KG/HR: 100 INJECTION, SOLUTION INTRAVENOUS at 13:53

## 2020-06-18 RX ADMIN — FENTANYL CITRATE 25 MCG: 50 INJECTION INTRAMUSCULAR; INTRAVENOUS at 01:12

## 2020-06-18 RX ADMIN — POLYETHYLENE GLYCOL 3350 1 PACKET: 17 POWDER, FOR SOLUTION ORAL at 00:03

## 2020-06-18 RX ADMIN — SODIUM CHLORIDE 5 MG/HR: 9 INJECTION, SOLUTION INTRAVENOUS at 07:21

## 2020-06-18 RX ADMIN — AMLODIPINE BESYLATE 10 MG: 10 TABLET ORAL at 13:28

## 2020-06-18 RX ADMIN — SODIUM CHLORIDE 5 MG/HR: 9 INJECTION, SOLUTION INTRAVENOUS at 06:47

## 2020-06-18 RX ADMIN — PIPERACILLIN AND TAZOBACTAM 4.5 G: 4; .5 INJECTION, POWDER, LYOPHILIZED, FOR SOLUTION INTRAVENOUS; PARENTERAL at 15:11

## 2020-06-18 RX ADMIN — FENTANYL CITRATE 50 MCG: 50 INJECTION INTRAMUSCULAR; INTRAVENOUS at 22:04

## 2020-06-18 RX ADMIN — FAMOTIDINE 20 MG: 10 INJECTION, SOLUTION INTRAVENOUS at 17:18

## 2020-06-18 RX ADMIN — FENTANYL CITRATE 50 MCG: 50 INJECTION INTRAMUSCULAR; INTRAVENOUS at 06:45

## 2020-06-18 RX ADMIN — Medication 75 MCG/HR: at 05:11

## 2020-06-18 RX ADMIN — PIPERACILLIN AND TAZOBACTAM 4.5 G: 4; .5 INJECTION, POWDER, LYOPHILIZED, FOR SOLUTION INTRAVENOUS; PARENTERAL at 21:00

## 2020-06-18 RX ADMIN — SODIUM CHLORIDE 1.25 UNITS/HR: 9 INJECTION, SOLUTION INTRAVENOUS at 08:22

## 2020-06-18 RX ADMIN — POTASSIUM CHLORIDE 10 MEQ: 10 INJECTION, SOLUTION INTRAVENOUS at 21:38

## 2020-06-18 RX ADMIN — OXYCODONE 5 MG: 5 TABLET ORAL at 13:28

## 2020-06-18 RX ADMIN — POTASSIUM CHLORIDE 10 MEQ: 10 INJECTION, SOLUTION INTRAVENOUS at 23:00

## 2020-06-18 RX ADMIN — PIPERACILLIN AND TAZOBACTAM 4.5 G: 4; .5 INJECTION, POWDER, LYOPHILIZED, FOR SOLUTION INTRAVENOUS; PARENTERAL at 04:33

## 2020-06-18 RX ADMIN — FENTANYL CITRATE 50 MCG: 50 INJECTION INTRAMUSCULAR; INTRAVENOUS at 04:29

## 2020-06-18 RX ADMIN — POTASSIUM CHLORIDE 10 MEQ: 7.46 INJECTION, SOLUTION INTRAVENOUS at 13:40

## 2020-06-18 RX ADMIN — POTASSIUM CHLORIDE 10 MEQ: 10 INJECTION, SOLUTION INTRAVENOUS at 16:16

## 2020-06-18 RX ADMIN — FENTANYL CITRATE 50 MCG: 50 INJECTION INTRAMUSCULAR; INTRAVENOUS at 20:07

## 2020-06-18 RX ADMIN — DEXMEDETOMIDINE HYDROCHLORIDE 0.5 MCG/KG/HR: 100 INJECTION, SOLUTION INTRAVENOUS at 20:25

## 2020-06-18 RX ADMIN — FENTANYL CITRATE 50 MCG: 50 INJECTION INTRAMUSCULAR; INTRAVENOUS at 10:40

## 2020-06-18 RX ADMIN — FAMOTIDINE 20 MG: 10 INJECTION, SOLUTION INTRAVENOUS at 05:13

## 2020-06-18 RX ADMIN — NICOTINE 14 MG: 14 PATCH TRANSDERMAL at 05:13

## 2020-06-18 RX ADMIN — FUROSEMIDE 40 MG: 10 INJECTION, SOLUTION INTRAMUSCULAR; INTRAVENOUS at 13:30

## 2020-06-18 RX ADMIN — SODIUM CHLORIDE 5 MG/HR: 9 INJECTION, SOLUTION INTRAVENOUS at 12:51

## 2020-06-18 ASSESSMENT — ENCOUNTER SYMPTOMS
WHEEZING: 0
HEARTBURN: 0
BRUISES/BLEEDS EASILY: 0
BLURRED VISION: 0
CHILLS: 0
HEMOPTYSIS: 0
FEVER: 1
PALPITATIONS: 0
NAUSEA: 0
COUGH: 0
FEVER: 0
ABDOMINAL PAIN: 1
ORTHOPNEA: 0
SHORTNESS OF BREATH: 1
DEPRESSION: 0
DIARRHEA: 0
VOMITING: 0
SPUTUM PRODUCTION: 0
FOCAL WEAKNESS: 0
DIZZINESS: 0
MYALGIAS: 0

## 2020-06-18 ASSESSMENT — FIBROSIS 4 INDEX: FIB4 SCORE: 2.79

## 2020-06-18 NOTE — PROGRESS NOTES
This RN in to assess pt, pt found to have cortrak in mouth, with tube chewed through; bridle also removed. When asked why pt chewed through tube, pt stated, 'I told you I didn't like it.' Internal aspect of cortrak found on floor by bed. Dr. Batres notified and orders to start on clear liquid diet. Per Dr. Batres, do not need to reinsert cortrak. Pt intermittently irritable and agitated overnight however, always oriented x4.     Per Dr. Batres, intra-abdominal pressure reading done. 21-22 mmHg. Dr. Batres notified. Orders to read intra abdominal pressure BID (once per shift). No further orders at this time.

## 2020-06-18 NOTE — PROGRESS NOTES
Dr. Batres notified of pt's increased O2 demand, requiring 7L NC at this time. RR in 20-30's. Pt irritable however, comfortable, talking on phone and watching tv at this time. Dr. Batres outside room to lay eyes on pt. No new orders at this time.       Pt also requiring use of cortak bridle. Ok from Dr. Batres to place. Pt did not tolerate well and very irritable about needing cortrak. This RN at bedside to explain need for cortrak and nutrition. Reinforcement needed.

## 2020-06-18 NOTE — PROGRESS NOTES
Nephrology returned page. Because patients labs have improved remarkably, they will not need to officially consult but state to reach out if anything changes with the patient. Dr. Jones updated

## 2020-06-18 NOTE — PROGRESS NOTES
Critical Care Progress Note    Date of admission  6/16/2020    Chief Complaint  34 y.o. male admitted 6/16/2020 with acute pancreatitis    Hospital Course    34 y.o. male who presented 6/16/2020 as a tx from an OSH for acute pancreatitis. Symptoms of back N&V and abdominal pain started 6/15. He was admitted to the OSH and worked up and found to have elevated triglycerides. It was suspected that his pancreatitis was related to elevated triglycerides and alcohol. MRCP was performed to evaluate for obstruction but the CBD was not visualized. He was started on an insulin gtt at the OSH hospital and transferred in the event that he may need plasmapheresis. On arrival he is tachycardic with MAPS > 65 on room air, complaining of mild abdominal pain. Neph and Gi were contacted for consultation prior to arrival      Interval Problem Update  Reviewed last 24 hour events:  T-max 100  +545cc over last 24 hours, +3.6L since admit  No cxr this am  MRI abdomen suboptimal  WBC 7.5 with 5% bands      Bcx 6/17 in process  MRSA nares 6/15 NEG    Zosyn 6/17-P    D10 half NS@200  Fentanyl@75  Insulin@2.5  Cardene @5    92% on 7 L  Last BM PTA    Review of Systems  Review of Systems   Constitutional: Positive for fever and malaise/fatigue. Negative for chills.   HENT: Negative for congestion.    Eyes: Negative for blurred vision.   Respiratory: Positive for shortness of breath. Negative for sputum production.    Cardiovascular: Negative for chest pain and palpitations.   Gastrointestinal: Positive for abdominal pain. Negative for nausea and vomiting.   Neurological: Negative for focal weakness.   Psychiatric/Behavioral: Negative for depression.   All other systems reviewed and are negative.       Vital Signs for last 24 hours   Temp:  [36.5 °C (97.7 °F)-37.9 °C (100.3 °F)] 37.5 °C (99.5 °F)  Pulse:  [] 123  Resp:  [14-41] 34  BP: ()/() 151/85  SpO2:  [88 %-97 %] 92 %    Hemodynamic parameters for last 24 hours        Respiratory Information for the last 24 hours       Physical Exam   Physical Exam  Vitals signs and nursing note reviewed.   Constitutional:       General: He is in acute distress.      Appearance: He is ill-appearing. He is not diaphoretic.   HENT:      Head: Normocephalic and atraumatic.   Eyes:      Conjunctiva/sclera: Conjunctivae normal.      Pupils: Pupils are equal, round, and reactive to light.   Cardiovascular:      Rate and Rhythm: Tachycardia present.      Pulses: Normal pulses.   Pulmonary:      Breath sounds: Rales present. No wheezing.      Comments: Diminished  Abdominal:      General: There is distension.      Tenderness: There is abdominal tenderness. There is no guarding or rebound.   Musculoskeletal:         General: Swelling present.   Skin:     General: Skin is warm and dry.   Neurological:      Cranial Nerves: No cranial nerve deficit.   Psychiatric:         Mood and Affect: Mood normal.         Medications  Current Facility-Administered Medications   Medication Dose Route Frequency Provider Last Rate Last Dose   • DEXTROSE-NACL 5-0.45 % IV SOLN            • nicotine (NICODERM) 14 MG/24HR 14 mg  14 mg Transdermal Daily-0600 Nery Burr M.D.   14 mg at 06/18/20 0513    And   • nicotine polacrilex (NICORETTE) 2 MG piece 2 mg  2 mg Oral Q HOUR PRN Nery Burr M.D.       • fentaNYL (SUBLIMAZE) 50 mcg/mL in 50mL (Continuous Infusion)   Intravenous Continuous Brad Batres M.D. 1.5 mL/hr at 06/18/20 0511 75 mcg/hr at 06/18/20 0511   • hydrALAZINE (APRESOLINE) injection 10-20 mg  10-20 mg Intravenous Q4HRS PRN Molina Jones M.D.       • labetalol (NORMODYNE/TRANDATE) injection 10-20 mg  10-20 mg Intravenous Q4HRS PRN Molina Jones M.D.   10 mg at 06/17/20 1508   • piperacillin-tazobactam (ZOSYN) 4.5 g in  mL IVPB  4.5 g Intravenous Q8HRS Molina Jones M.D. 25 mL/hr at 06/18/20 0433 4.5 g at 06/18/20 0433   • enoxaparin (LOVENOX) inj 40 mg  40 mg Subcutaneous DAILY Molina  ALEAH Jones M.D.   40 mg at 06/18/20 0513   • famotidine (PEPCID) injection 20 mg  20 mg Intravenous BID Molina Jones M.D.   20 mg at 06/18/20 0513   • niCARdipine (CARDENE) 25 mg in  mL Infusion  0-15 mg/hr Intravenous Continuous Molina Jones M.D. 50 mL/hr at 06/18/20 0721 5 mg/hr at 06/18/20 0721   • insulin regular human (HUMULIN/NOVOLIN R) 62.5 Units in  mL Infusion  2.5 Units/hr Intravenous Continuous Molina Jones M.D. 10 mL/hr at 06/17/20 1645 2.5 Units/hr at 06/17/20 1645   • Pharmacy Consult: Enteral tube insertion - review meds/change route/product selection  1 Each Other PHARMACY TO DOSE Molina Jones M.D.       • senna-docusate (PERICOLACE or SENOKOT S) 8.6-50 MG per tablet 2 Tab  2 Tab Enteral Tube BID Molina Jones M.D.   Stopped at 06/18/20 0600    And   • polyethylene glycol/lytes (MIRALAX) PACKET 1 Packet  1 Packet Enteral Tube QDAY PRN Molina Jones M.D.   1 Packet at 06/18/20 0003    And   • magnesium hydroxide (MILK OF MAGNESIA) suspension 30 mL  30 mL Enteral Tube QDAY PRN Molina Jones M.D.        And   • bisacodyl (DULCOLAX) suppository 10 mg  10 mg Rectal QDAY PRN Molina Jones M.D.       • DEXTROSE-NACL 5-0.45 % IV SOLDIAMOND            • MD Alert...ICU Electrolyte Replacement per Pharmacy   Other PHARMACY TO DOSE Carter Ferrer M.D.       • fentaNYL (SUBLIMAZE) injection 25-50 mcg  25-50 mcg Intravenous Q2HRS PRN Carter Ferrer M.D.   50 mcg at 06/18/20 0645   • D10%-0.45% NaCl infusion   Intravenous Continuous Molina Jones M.D. 200 mL/hr at 06/18/20 0720     • glucose 4 g chewable tablet 16 g  16 g Oral Q15 MIN PRN Carter Ferrer M.D.        And   • dextrose 50% (D50W) injection 50 mL  50 mL Intravenous Q15 MIN PRN Carter Ferrer M.D.       • K+ Scale: Goal of 4.5  1 Each Intravenous Q6HRS Carter Ferrer M.D.   Stopped at 06/18/20 0800       Fluids    Intake/Output Summary (Last 24 hours) at 6/18/2020 0755  Last data filed at 6/18/2020 0600  Gross per  24 hour   Intake 3270.08 ml   Output 2725 ml   Net 545.08 ml       Laboratory  Recent Labs     06/16/20  1740   [id number] 7.28*   [id number] 30.9   [id number] 82.8*   [id number] 95.4*   ARTHCO3 14*   FIO2 60   ARTBE -11*         Recent Labs     06/16/20  2310 06/17/20  0540 06/17/20  0800 06/17/20  1819 06/17/20  2355 06/18/20  0435   SODIUM 129* 129*  --   --   --   --  128*   POTASSIUM 4.2 3.9  --    < > 4.0 3.7 3.8   CHLORIDE 103 104  --   --   --   --  100   CO2 15* 18*  --   --   --   --  21   BUN 27* 20  --   --   --   --  9   CREATININE 1.16 0.83  --   --   --   --  0.59   MAGNESIUM 1.8  --  2.1  --   --   --  2.2   CALCIUM 7.1* 7.3*  --   --   --   --  7.3*    < > = values in this interval not displayed.     Recent Labs     06/15/20  1143 06/16/20  0605 06/16/20  2310 06/17/20  0540 06/17/20  0800 06/18/20  0435   ALTSGPT 94* 48  --   --   --   --  25   ASTSGOT 83* 65*  --   --   --   --  46*   ALKPHOSPHAT 133* 67  --   --   --   --  45   TBILIRUBIN 2.4* 1.8*  --   --   --   --  1.4   DBILIRUBIN 1.1*  --   --   --   --   --   --    LIPASE 3539* 5269*  --   --   --  811* 420*   PREALBUMIN  --   --   --   --   --   --  10.0*   GLUCOSE 158* 208*   < > 162* 125*  --  137*    < > = values in this interval not displayed.     Recent Labs     06/15/20  1143  06/16/20  0605 06/17/20  0540 06/17/20  0800 06/18/20  0435   WBC 14.4*  --  8.2 7.3 7.2 7.5   NEUTSPOLYS  --    < > 50 77.70* 70.20 66.10   LYMPHOCYTES  --    < > 13* 13.20* 14.90* 14.80*   MONOCYTES  --    < > 6 6.60 7.90 12.20   EOSINOPHILS  --   --   --  1.70 0.00 1.70   BASOPHILS  --   --   --  0.00 0.00 0.00   ASTSGOT 83*  --  65*  --   --  46*   ALTSGPT 94*  --  48  --   --  25   ALKPHOSPHAT 133*  --  67  --   --  45   TBILIRUBIN 2.4*  --  1.8*  --   --  1.4    < > = values in this interval not displayed.     Recent Labs     06/17/20  0540 06/17/20  0800 06/18/20  0435   RBC 4.62* 4.65* 3.78*   HEMOGLOBIN 15.4 15.4 12.6*   HEMATOCRIT 45.8 46.4 38.2*    PLATELETCT 124* 134* 112*       Imaging  X-Ray:  No film today    Assessment/Plan  Acute pancreatitis- (present on admission)  Assessment & Plan  Likely from TG and alcohol  Titrate insulin/D10 for treatment of TG  Pain control  Will need to initiate enteral feeds as early as possible via feeding tube  Monitor for evidence of abdominal compartment syndrome  Change infusion rates to 100/h    Hypertension  Assessment & Plan  Start amlodipine 10 mg daily  Continue Cardene infusion with active titration  PRN's to maintain SBP less than 160    Hypocalcemia  Assessment & Plan  Likely 2/2 to pancreatitis  Monitor  Replete per protocol    Hypertriglyceridemia  Assessment & Plan  Familial?  Monitor and tx per above  May benefit from apheresis if not improving with medical management  Fenofibrate when clinically appropriate  Improving    Alcohol use- (present on admission)  Assessment & Plan  Note near daily beer consumption  Monitor for W/ds  Utilize Precedex if needed    Hyponatremia- (present on admission)  Assessment & Plan  Likely from elevated TG  Monitor    Leukocytosis- (present on admission)  Assessment & Plan  Now with significant bandemia  Follow-up cultures  Continue course of antibiotics  Trend    Tobacco abuse  Assessment & Plan  Counseling when clinically appropriate       VTE:  Lovenox  Ulcer: H2 Antagonist  Lines: None    I have performed a physical exam and reviewed and updated ROS and Plan today (6/18/2020). In review of yesterday's note (6/17/2020), there are no changes except as documented above.     Discussed patient condition and risk of morbidity and/or mortality with RN, RT, Pharmacy, Patient and GI  The patient remains critically ill.  Critical care time = 32 minutes in directly providing and coordinating critical care and extensive data review.  No time overlap and excludes procedures.

## 2020-06-19 LAB
ALBUMIN SERPL BCP-MCNC: 2.6 G/DL (ref 3.2–4.9)
ALBUMIN/GLOB SERPL: 0.8 G/DL
ALP SERPL-CCNC: 54 U/L (ref 30–99)
ALT SERPL-CCNC: 25 U/L (ref 2–50)
ANION GAP SERPL CALC-SCNC: 8 MMOL/L (ref 7–16)
AST SERPL-CCNC: 38 U/L (ref 12–45)
BASOPHILS # BLD AUTO: 0.9 % (ref 0–1.8)
BASOPHILS # BLD: 0.06 K/UL (ref 0–0.12)
BILIRUB SERPL-MCNC: 1.2 MG/DL (ref 0.1–1.5)
BUN SERPL-MCNC: 9 MG/DL (ref 8–22)
CA-I SERPL-SCNC: 1 MMOL/L (ref 1.1–1.3)
CALCIUM SERPL-MCNC: 7.8 MG/DL (ref 8.5–10.5)
CHLORIDE SERPL-SCNC: 103 MMOL/L (ref 96–112)
CO2 SERPL-SCNC: 23 MMOL/L (ref 20–33)
CREAT SERPL-MCNC: 0.63 MG/DL (ref 0.5–1.4)
EOSINOPHIL # BLD AUTO: 0.5 K/UL (ref 0–0.51)
EOSINOPHIL NFR BLD: 7.3 % (ref 0–6.9)
ERYTHROCYTE [DISTWIDTH] IN BLOOD BY AUTOMATED COUNT: 54.2 FL (ref 35.9–50)
GLOBULIN SER CALC-MCNC: 3.4 G/DL (ref 1.9–3.5)
GLUCOSE BLD-MCNC: 86 MG/DL (ref 65–99)
GLUCOSE BLD-MCNC: 98 MG/DL (ref 65–99)
GLUCOSE SERPL-MCNC: 95 MG/DL (ref 65–99)
HCT VFR BLD AUTO: 34.9 % (ref 42–52)
HGB BLD-MCNC: 11.4 G/DL (ref 14–18)
LIPASE SERPL-CCNC: 155 U/L (ref 11–82)
LYMPHOCYTES # BLD AUTO: 1.07 K/UL (ref 1–4.8)
LYMPHOCYTES NFR BLD: 15.5 % (ref 22–41)
MAGNESIUM SERPL-MCNC: 2.3 MG/DL (ref 1.5–2.5)
MANUAL DIFF BLD: NORMAL
MCH RBC QN AUTO: 33.3 PG (ref 27–33)
MCHC RBC AUTO-ENTMCNC: 32.7 G/DL (ref 33.7–35.3)
MCV RBC AUTO: 102 FL (ref 81.4–97.8)
METAMYELOCYTES NFR BLD MANUAL: 2.7 %
MONOCYTES # BLD AUTO: 1.32 K/UL (ref 0–0.85)
MONOCYTES NFR BLD AUTO: 19.1 % (ref 0–13.4)
MORPHOLOGY BLD-IMP: NORMAL
MYELOCYTES NFR BLD MANUAL: 2.7 %
NEUTROPHILS # BLD AUTO: 3.57 K/UL (ref 1.82–7.42)
NEUTROPHILS NFR BLD: 49.1 % (ref 44–72)
NEUTS BAND NFR BLD MANUAL: 2.7 % (ref 0–10)
NRBC # BLD AUTO: 0 K/UL
NRBC BLD-RTO: 0 /100 WBC
PLATELET # BLD AUTO: 111 K/UL (ref 164–446)
PLATELET BLD QL SMEAR: NORMAL
PMV BLD AUTO: 9.4 FL (ref 9–12.9)
POTASSIUM SERPL-SCNC: 3.8 MMOL/L (ref 3.6–5.5)
POTASSIUM SERPL-SCNC: 3.9 MMOL/L (ref 3.6–5.5)
PROT SERPL-MCNC: 6 G/DL (ref 6–8.2)
RBC # BLD AUTO: 3.42 M/UL (ref 4.7–6.1)
SODIUM SERPL-SCNC: 134 MMOL/L (ref 135–145)
TRIGL SERPL-MCNC: 318 MG/DL (ref 0–149)
WBC # BLD AUTO: 6.9 K/UL (ref 4.8–10.8)

## 2020-06-19 PROCEDURE — 700111 HCHG RX REV CODE 636 W/ 250 OVERRIDE (IP): Performed by: HOSPITALIST

## 2020-06-19 PROCEDURE — 82330 ASSAY OF CALCIUM: CPT

## 2020-06-19 PROCEDURE — 700105 HCHG RX REV CODE 258

## 2020-06-19 PROCEDURE — 84132 ASSAY OF SERUM POTASSIUM: CPT

## 2020-06-19 PROCEDURE — 700111 HCHG RX REV CODE 636 W/ 250 OVERRIDE (IP): Performed by: PSYCHIATRY & NEUROLOGY

## 2020-06-19 PROCEDURE — A9270 NON-COVERED ITEM OR SERVICE: HCPCS | Performed by: INTERNAL MEDICINE

## 2020-06-19 PROCEDURE — 84478 ASSAY OF TRIGLYCERIDES: CPT

## 2020-06-19 PROCEDURE — 700102 HCHG RX REV CODE 250 W/ 637 OVERRIDE(OP): Performed by: INTERNAL MEDICINE

## 2020-06-19 PROCEDURE — 83690 ASSAY OF LIPASE: CPT

## 2020-06-19 PROCEDURE — 85027 COMPLETE CBC AUTOMATED: CPT

## 2020-06-19 PROCEDURE — 82962 GLUCOSE BLOOD TEST: CPT | Mod: 91

## 2020-06-19 PROCEDURE — 700101 HCHG RX REV CODE 250: Performed by: INTERNAL MEDICINE

## 2020-06-19 PROCEDURE — 80053 COMPREHEN METABOLIC PANEL: CPT

## 2020-06-19 PROCEDURE — 770022 HCHG ROOM/CARE - ICU (200)

## 2020-06-19 PROCEDURE — 700111 HCHG RX REV CODE 636 W/ 250 OVERRIDE (IP): Performed by: INTERNAL MEDICINE

## 2020-06-19 PROCEDURE — 99291 CRITICAL CARE FIRST HOUR: CPT | Performed by: INTERNAL MEDICINE

## 2020-06-19 PROCEDURE — 700105 HCHG RX REV CODE 258: Performed by: INTERNAL MEDICINE

## 2020-06-19 PROCEDURE — 85007 BL SMEAR W/DIFF WBC COUNT: CPT

## 2020-06-19 PROCEDURE — 83735 ASSAY OF MAGNESIUM: CPT

## 2020-06-19 RX ORDER — LIDOCAINE 50 MG/G
1 PATCH TOPICAL EVERY 24 HOURS
Status: DISCONTINUED | OUTPATIENT
Start: 2020-06-19 | End: 2020-06-20 | Stop reason: HOSPADM

## 2020-06-19 RX ORDER — POTASSIUM CHLORIDE 7.45 MG/ML
10 INJECTION INTRAVENOUS ONCE
Status: COMPLETED | OUTPATIENT
Start: 2020-06-19 | End: 2020-06-19

## 2020-06-19 RX ORDER — FUROSEMIDE 10 MG/ML
40 INJECTION INTRAMUSCULAR; INTRAVENOUS ONCE
Status: COMPLETED | OUTPATIENT
Start: 2020-06-19 | End: 2020-06-19

## 2020-06-19 RX ORDER — THIAMINE MONONITRATE (VIT B1) 100 MG
100 TABLET ORAL DAILY
Status: DISCONTINUED | OUTPATIENT
Start: 2020-06-19 | End: 2020-06-20 | Stop reason: HOSPADM

## 2020-06-19 RX ORDER — OXYCODONE HYDROCHLORIDE 10 MG/1
10 TABLET ORAL EVERY 4 HOURS PRN
Status: DISCONTINUED | OUTPATIENT
Start: 2020-06-19 | End: 2020-06-20 | Stop reason: HOSPADM

## 2020-06-19 RX ORDER — FOLIC ACID 1 MG/1
1 TABLET ORAL DAILY
Status: DISCONTINUED | OUTPATIENT
Start: 2020-06-19 | End: 2020-06-20 | Stop reason: HOSPADM

## 2020-06-19 RX ORDER — PHENYLEPHRINE HCL IN 0.9% NACL 0.5 MG/5ML
SYRINGE (ML) INTRAVENOUS
Status: COMPLETED
Start: 2020-06-19 | End: 2020-06-19

## 2020-06-19 RX ORDER — OXYCODONE HYDROCHLORIDE 5 MG/1
5 TABLET ORAL EVERY 4 HOURS PRN
Status: DISCONTINUED | OUTPATIENT
Start: 2020-06-19 | End: 2020-06-20 | Stop reason: HOSPADM

## 2020-06-19 RX ORDER — SODIUM CHLORIDE 9 MG/ML
INJECTION, SOLUTION INTRAVENOUS
Status: DISCONTINUED
Start: 2020-06-19 | End: 2020-06-20

## 2020-06-19 RX ADMIN — OXYCODONE HYDROCHLORIDE 10 MG: 10 TABLET ORAL at 11:00

## 2020-06-19 RX ADMIN — ENOXAPARIN SODIUM 40 MG: 40 INJECTION SUBCUTANEOUS at 05:47

## 2020-06-19 RX ADMIN — Medication 100 MG: at 11:40

## 2020-06-19 RX ADMIN — PIPERACILLIN AND TAZOBACTAM 4.5 G: 4; .5 INJECTION, POWDER, LYOPHILIZED, FOR SOLUTION INTRAVENOUS; PARENTERAL at 12:40

## 2020-06-19 RX ADMIN — OXYCODONE HYDROCHLORIDE 10 MG: 10 TABLET ORAL at 15:20

## 2020-06-19 RX ADMIN — FENTANYL CITRATE 50 MCG: 50 INJECTION INTRAMUSCULAR; INTRAVENOUS at 10:00

## 2020-06-19 RX ADMIN — POTASSIUM CHLORIDE 10 MEQ: 7.46 INJECTION, SOLUTION INTRAVENOUS at 08:43

## 2020-06-19 RX ADMIN — FUROSEMIDE 40 MG: 10 INJECTION, SOLUTION INTRAMUSCULAR; INTRAVENOUS at 08:43

## 2020-06-19 RX ADMIN — DEXMEDETOMIDINE HYDROCHLORIDE 0.7 MCG/KG/HR: 100 INJECTION, SOLUTION INTRAVENOUS at 01:56

## 2020-06-19 RX ADMIN — FENTANYL CITRATE 50 MCG: 50 INJECTION INTRAMUSCULAR; INTRAVENOUS at 14:00

## 2020-06-19 RX ADMIN — OXYCODONE HYDROCHLORIDE 10 MG: 10 TABLET ORAL at 20:11

## 2020-06-19 RX ADMIN — DOCUSATE SODIUM 50 MG AND SENNOSIDES 8.6 MG 2 TABLET: 8.6; 5 TABLET, FILM COATED ORAL at 05:46

## 2020-06-19 RX ADMIN — FENTANYL CITRATE 50 MCG: 50 INJECTION INTRAMUSCULAR; INTRAVENOUS at 04:42

## 2020-06-19 RX ADMIN — THERA TABS 1 TABLET: TAB at 11:40

## 2020-06-19 RX ADMIN — POTASSIUM CHLORIDE 10 MEQ: 7.46 INJECTION, SOLUTION INTRAVENOUS at 02:36

## 2020-06-19 RX ADMIN — FOLIC ACID 1 MG: 1 TABLET ORAL at 11:40

## 2020-06-19 RX ADMIN — AMLODIPINE BESYLATE 10 MG: 10 TABLET ORAL at 05:46

## 2020-06-19 RX ADMIN — CALCIUM GLUCONATE 1 G: 98 INJECTION, SOLUTION INTRAVENOUS at 10:00

## 2020-06-19 RX ADMIN — PIPERACILLIN AND TAZOBACTAM 4.5 G: 4; .5 INJECTION, POWDER, LYOPHILIZED, FOR SOLUTION INTRAVENOUS; PARENTERAL at 05:46

## 2020-06-19 RX ADMIN — FAMOTIDINE 20 MG: 10 INJECTION, SOLUTION INTRAVENOUS at 05:46

## 2020-06-19 RX ADMIN — OXYCODONE 5 MG: 5 TABLET ORAL at 00:09

## 2020-06-19 RX ADMIN — PIPERACILLIN AND TAZOBACTAM 4.5 G: 4; .5 INJECTION, POWDER, LYOPHILIZED, FOR SOLUTION INTRAVENOUS; PARENTERAL at 20:19

## 2020-06-19 RX ADMIN — DEXMEDETOMIDINE HYDROCHLORIDE 0.35 MCG/KG/HR: 100 INJECTION, SOLUTION INTRAVENOUS at 11:38

## 2020-06-19 RX ADMIN — LIDOCAINE 1 PATCH: 50 PATCH TOPICAL at 12:40

## 2020-06-19 RX ADMIN — FAMOTIDINE 20 MG: 10 INJECTION, SOLUTION INTRAVENOUS at 18:09

## 2020-06-19 RX ADMIN — OXYCODONE 5 MG: 5 TABLET ORAL at 08:51

## 2020-06-19 RX ADMIN — OXYCODONE 5 MG: 5 TABLET ORAL at 04:43

## 2020-06-19 ASSESSMENT — ENCOUNTER SYMPTOMS
DEPRESSION: 0
BLURRED VISION: 0
FOCAL WEAKNESS: 0
FEVER: 0
SHORTNESS OF BREATH: 0
PALPITATIONS: 0
CHILLS: 0
ABDOMINAL PAIN: 1
VOMITING: 0
SPUTUM PRODUCTION: 0
NAUSEA: 0

## 2020-06-19 NOTE — PROGRESS NOTES
Critical Care Progress Note    Date of admission  6/16/2020    Chief Complaint  34 y.o. male admitted 6/16/2020 with acute pancreatitis    Hospital Course    34 y.o. male who presented 6/16/2020 as a tx from an OSH for acute pancreatitis. Symptoms of back N&V and abdominal pain started 6/15. He was admitted to the OSH and worked up and found to have elevated triglycerides. It was suspected that his pancreatitis was related to elevated triglycerides and alcohol. MRCP was performed to evaluate for obstruction but the CBD was not visualized. He was started on an insulin gtt at the OSH hospital and transferred in the event that he may need plasmapheresis. On arrival he is tachycardic with MAPS > 65 on room air, complaining of mild abdominal pain. Neph and Gi were contacted for consultation prior to arrival      Interval Problem Update  Reviewed last 24 hour events:  T-max 100.2  + 1.3 L over last 24 hours, + 4.9 L since admit  No cxr this am  MRI abdomen suboptimal  Labs reviewed    Bcx 6/17 in process  MRSA nares 6/15 NEG    Zosyn 6/17-P    Precedex@0.4    95% on 3 L  Last BM 6/18    Review of Systems  Review of Systems   Constitutional: Positive for malaise/fatigue. Negative for chills and fever.   HENT: Negative for congestion.    Eyes: Negative for blurred vision.   Respiratory: Negative for sputum production and shortness of breath.    Cardiovascular: Negative for chest pain and palpitations.   Gastrointestinal: Positive for abdominal pain (Improving). Negative for nausea and vomiting.   Neurological: Negative for focal weakness.   Psychiatric/Behavioral: Negative for depression.   All other systems reviewed and are negative.       Vital Signs for last 24 hours   Temp:  [36.7 °C (98.1 °F)-37.9 °C (100.2 °F)] 37.2 °C (99 °F)  Pulse:  [] 96  Resp:  [17-39] 29  BP: ()/(45-89) 113/58  SpO2:  [78 %-99 %] 95 %    Hemodynamic parameters for last 24 hours       Respiratory Information for the last 24 hours        Physical Exam   Physical Exam  Vitals signs and nursing note reviewed.   Constitutional:       Appearance: He is ill-appearing. He is not diaphoretic.   HENT:      Head: Normocephalic and atraumatic.   Eyes:      Conjunctiva/sclera: Conjunctivae normal.      Pupils: Pupils are equal, round, and reactive to light.   Cardiovascular:      Rate and Rhythm: Normal rate.      Pulses: Normal pulses.   Pulmonary:      Breath sounds: Rales present. No wheezing.      Comments: Diminished  Abdominal:      General: There is distension.      Tenderness: There is abdominal tenderness. There is no guarding or rebound.   Musculoskeletal:         General: Swelling present.   Skin:     General: Skin is warm and dry.   Neurological:      Mental Status: He is alert.      Cranial Nerves: No cranial nerve deficit.   Psychiatric:         Mood and Affect: Mood normal.         Medications  Current Facility-Administered Medications   Medication Dose Route Frequency Provider Last Rate Last Dose   • potassium chloride (KCL) ivpb 10 mEq  10 mEq Intravenous Once Paige Llanes M.D.       • lactated ringers infusion   Intravenous Continuous Molina Jones M.D.   Stopped at 06/18/20 1230   • amLODIPine (NORVASC) tablet 10 mg  10 mg Oral Q DAY Molina Jones M.D.   10 mg at 06/19/20 0546   • oxyCODONE immediate-release (ROXICODONE) tablet 5 mg  5 mg Oral Q4HRS PRN Molina Jones M.D.   5 mg at 06/19/20 0443   • dexmedetomidine (PRECEDEX) 400 mcg/100mL NS premix infusion  0.1-1.5 mcg/kg/hr Intravenous Continuous Molina Jones M.D. 11.2 mL/hr at 06/19/20 0630 0.4 mcg/kg/hr at 06/19/20 0630   • nicotine (NICODERM) 14 MG/24HR 14 mg  14 mg Transdermal Daily-0600 Nery Burr M.D.   14 mg at 06/18/20 0513    And   • nicotine polacrilex (NICORETTE) 2 MG piece 2 mg  2 mg Oral Q HOUR PRN Nery Burr M.D.       • fentaNYL (SUBLIMAZE) 50 mcg/mL in 50mL (Continuous Infusion)   Intravenous Continuous Brad Batres M.D.   Stopped at  06/18/20 1600   • hydrALAZINE (APRESOLINE) injection 10-20 mg  10-20 mg Intravenous Q4HRS PRN Molina Jones M.D.       • labetalol (NORMODYNE/TRANDATE) injection 10-20 mg  10-20 mg Intravenous Q4HRS PRN Molina Jones M.D.   10 mg at 06/17/20 1508   • piperacillin-tazobactam (ZOSYN) 4.5 g in  mL IVPB  4.5 g Intravenous Q8HRS Molina Jones M.D. 25 mL/hr at 06/19/20 0546 4.5 g at 06/19/20 0546   • enoxaparin (LOVENOX) inj 40 mg  40 mg Subcutaneous DAILY Molina Jones M.D.   40 mg at 06/19/20 0547   • famotidine (PEPCID) injection 20 mg  20 mg Intravenous BID Molina Jones M.D.   20 mg at 06/19/20 0546   • niCARdipine (CARDENE) 25 mg in  mL Infusion  0-15 mg/hr Intravenous Continuous Molina Jones M.D.   Stopped at 06/18/20 1442   • Pharmacy Consult: Enteral tube insertion - review meds/change route/product selection  1 Each Other PHARMACY TO DOSE Molina Jones M.D.       • senna-docusate (PERICOLACE or SENOKOT S) 8.6-50 MG per tablet 2 Tab  2 Tab Enteral Tube BID Molina Jones M.D.   2 Tab at 06/19/20 0546    And   • polyethylene glycol/lytes (MIRALAX) PACKET 1 Packet  1 Packet Enteral Tube QDAY PRN Molina Jones M.D.   1 Packet at 06/18/20 0003    And   • magnesium hydroxide (MILK OF MAGNESIA) suspension 30 mL  30 mL Enteral Tube QDAY PRN Molina Jones M.D.        And   • bisacodyl (DULCOLAX) suppository 10 mg  10 mg Rectal QDAY PRN Molina Jones M.D.       • MD Alert...ICU Electrolyte Replacement per Pharmacy   Other PHARMACY TO DOSE Carter Ferrer M.D.       • fentaNYL (SUBLIMAZE) injection 25-50 mcg  25-50 mcg Intravenous Q2HRS PRN Carter Ferrer M.D.   50 mcg at 06/19/20 0442   • K+ Scale: Goal of 4.5  1 Each Intravenous Q6HRS Carter Ferrer M.D.   1 Each at 06/19/20 0600       Fluids    Intake/Output Summary (Last 24 hours) at 6/19/2020 0714  Last data filed at 6/19/2020 0600  Gross per 24 hour   Intake 6789.49 ml   Output 5475 ml   Net 1314.49 ml        Laboratory  Recent Labs     06/16/20  1740   [id number] 7.28*   [id number] 30.9   [id number] 82.8*   [id number] 95.4*   ARTHCO3 14*   FIO2 60   ARTBE -11*         Recent Labs     06/17/20 0540 06/17/20  0800 06/18/20 0435 06/18/20 2017 06/19/20 0128 06/19/20  0552   SODIUM 129*  --   --  128*  --   --   --  134*   POTASSIUM 3.9  --    < > 3.8   < > 3.6 3.8 3.9   CHLORIDE 104  --   --  100  --   --   --  103   CO2 18*  --   --  21  --   --   --  23   BUN 20  --   --  9  --   --   --  9   CREATININE 0.83  --   --  0.59  --   --   --  0.63   MAGNESIUM  --  2.1  --  2.2  --   --   --  2.3   CALCIUM 7.3*  --   --  7.3*  --   --   --  7.8*    < > = values in this interval not displayed.     Recent Labs     06/17/20 0540 06/17/20 0800 06/18/20 0435 06/19/20  0552   ALTSGPT  --   --  25 25   ASTSGOT  --   --  46* 38   ALKPHOSPHAT  --   --  45 54   TBILIRUBIN  --   --  1.4 1.2   LIPASE  --  811* 420* 155*   PREALBUMIN  --   --  10.0*  --    GLUCOSE 125*  --  137* 95     Recent Labs     06/17/20 0540 06/17/20  0800 06/18/20 0435 06/19/20  0552   WBC 7.3 7.2 7.5 6.9   NEUTSPOLYS 77.70* 70.20 66.10  --    LYMPHOCYTES 13.20* 14.90* 14.80*  --    MONOCYTES 6.60 7.90 12.20  --    EOSINOPHILS 1.70 0.00 1.70  --    BASOPHILS 0.00 0.00 0.00  --    ASTSGOT  --   --  46* 38   ALTSGPT  --   --  25 25   ALKPHOSPHAT  --   --  45 54   TBILIRUBIN  --   --  1.4 1.2     Recent Labs     06/17/20  0800 06/18/20  0435 06/19/20  0552   RBC 4.65* 3.78* 3.42*   HEMOGLOBIN 15.4 12.6* 11.4*   HEMATOCRIT 46.4 38.2* 34.9*   PLATELETCT 134* 112* 111*       Imaging  X-Ray:  No film today    Assessment/Plan  Acute pancreatitis- (present on admission)  Assessment & Plan  Likely from TG and alcohol  Pain control  Advance diet to low-fat  DC IVF  Aggressive electrolyte optimization    Hypertension  Assessment & Plan  Continue amlodipine 10 mg daily  Off Cardene infusion  PRN's to maintain SBP less than 160    Hypocalcemia  Assessment &  Plan  Likely 2/2 to pancreatitis  Monitor  Replete per protocol    Hypertriglyceridemia  Assessment & Plan  Familial?  Monitor and tx per above  Fenofibrate when clinically appropriate  Improving    Alcohol use- (present on admission)  Assessment & Plan  Note near daily beer consumption  Monitor for W/ds  Continue Precedex with active titration  Thiamine/folate/MVI    Hyponatremia- (present on admission)  Assessment & Plan  Likely from elevated TG  Monitor    Leukocytosis- (present on admission)  Assessment & Plan  Improving  Complete 5-day course of antibiotics    Tobacco abuse  Assessment & Plan  Counseling when clinically appropriate       VTE:  Lovenox  Ulcer: H2 Antagonist  Lines: None    I have performed a physical exam and reviewed and updated ROS and Plan today (6/19/2020). In review of yesterday's note (6/18/2020), there are no changes except as documented above.     Discussed patient condition and risk of morbidity and/or mortality with RN, RT, Pharmacy, Patient and GI  The patient remains critically ill.  Critical care time = 35 minutes in directly providing and coordinating critical care and extensive data review.  No time overlap and excludes procedures.

## 2020-06-19 NOTE — PROGRESS NOTES
Gastroenterology Progress Note     Author: Libia Frederick M.D.   Date & Time Created: 6/18/2020 5:31 PM    Chief Complaint:  Acute pancreatitis    Interval History:  Feeling much better though still has upper abdominal pain.  Hemodynamically stable.    Review of Systems:  Review of Systems   Constitutional: Negative for chills and fever.   Eyes: Negative for blurred vision.   Respiratory: Negative for cough, hemoptysis and wheezing.    Cardiovascular: Negative for chest pain, orthopnea and leg swelling.   Gastrointestinal: Positive for abdominal pain. Negative for diarrhea, heartburn and melena.   Musculoskeletal: Negative for myalgias.   Neurological: Negative for dizziness.   Endo/Heme/Allergies: Does not bruise/bleed easily.     Current Facility-Administered Medications:   •  lactated ringers infusion, , Intravenous, Continuous, Molina Jones M.D., Stopped at 06/18/20 1230  •  amLODIPine (NORVASC) tablet 10 mg, 10 mg, Oral, Q DAY, Molina Jones M.D., 10 mg at 06/18/20 1328  •  oxyCODONE immediate-release (ROXICODONE) tablet 5 mg, 5 mg, Oral, Q4HRS PRN, Molina Jones M.D., 5 mg at 06/18/20 1328  •  dexmedetomidine (PRECEDEX) 400 mcg/100mL NS premix infusion, 0.1-1.5 mcg/kg/hr, Intravenous, Continuous, Molina Jones M.D., Last Rate: 14.1 mL/hr at 06/18/20 1430, 0.5 mcg/kg/hr at 06/18/20 1430  •  nicotine (NICODERM) 14 MG/24HR 14 mg, 14 mg, Transdermal, Daily-0600, 14 mg at 06/18/20 0513 **AND** Nicotine Replacement Patient Education Materials, , , Once **AND** nicotine polacrilex (NICORETTE) 2 MG piece 2 mg, 2 mg, Oral, Q HOUR PRN, Nery Burr M.D.  •  fentaNYL (SUBLIMAZE) 50 mcg/mL in 50mL (Continuous Infusion), , Intravenous, Continuous, Brad Batres M.D., Last Rate: 1.5 mL/hr at 06/18/20 0511, 75 mcg/hr at 06/18/20 0511  •  hydrALAZINE (APRESOLINE) injection 10-20 mg, 10-20 mg, Intravenous, Q4HRS PRN, Molina Jones M.D.  •  labetalol (NORMODYNE/TRANDATE) injection 10-20 mg, 10-20  mg, Intravenous, Q4HRS PRN, Molina Jones M.D., 10 mg at 06/17/20 1508  •  [COMPLETED] piperacillin-tazobactam (ZOSYN) 4.5 g in  mL IVPB, 4.5 g, Intravenous, Once, Stopped at 06/17/20 1051 **AND** piperacillin-tazobactam (ZOSYN) 4.5 g in  mL IVPB, 4.5 g, Intravenous, Q8HRS, Molina Jones M.D., Last Rate: 25 mL/hr at 06/18/20 1511, 4.5 g at 06/18/20 1511  •  enoxaparin (LOVENOX) inj 40 mg, 40 mg, Subcutaneous, DAILY, Molina Jones M.D., 40 mg at 06/18/20 0513  •  famotidine (PEPCID) injection 20 mg, 20 mg, Intravenous, BID, Molina Jones M.D., 20 mg at 06/18/20 1718  •  niCARdipine (CARDENE) 25 mg in  mL Infusion, 0-15 mg/hr, Intravenous, Continuous, Molina Jones M.D., Stopped at 06/18/20 1442  •  Pharmacy Consult: Enteral tube insertion - review meds/change route/product selection, 1 Each, Other, PHARMACY TO DOSE, Molian Jones M.D.  •  senna-docusate (PERICOLACE or SENOKOT S) 8.6-50 MG per tablet 2 Tab, 2 Tab, Enteral Tube, BID, Stopped at 06/18/20 0600 **AND** polyethylene glycol/lytes (MIRALAX) PACKET 1 Packet, 1 Packet, Enteral Tube, QDAY PRN, 1 Packet at 06/18/20 0003 **AND** magnesium hydroxide (MILK OF MAGNESIA) suspension 30 mL, 30 mL, Enteral Tube, QDAY PRN **AND** bisacodyl (DULCOLAX) suppository 10 mg, 10 mg, Rectal, QDAY PRN, Molina Jones M.D.  •  MD Alert...ICU Electrolyte Replacement per Pharmacy, , Other, PHARMACY TO DOSE, Carter Ferrer M.D.  •  fentaNYL (SUBLIMAZE) injection 25-50 mcg, 25-50 mcg, Intravenous, Q2HRS PRN, Carter Ferrer M.D., 50 mcg at 06/18/20 1040  •  K+ Scale: Goal of 4.5, 1 Each, Intravenous, Q6HRS, Carter Ferrer M.D., Stopped at 06/18/20 1900     Physical Exam:  Physical Exam   Constitutional: He is oriented to person, place, and time. He appears well-developed and well-nourished. No distress.   HENT:   Head: Normocephalic and atraumatic.   Eyes: No scleral icterus.   Neck: No tracheal deviation present. No thyromegaly present.    Cardiovascular: Normal rate and regular rhythm.   Pulmonary/Chest: Effort normal and breath sounds normal. No respiratory distress.   Abdominal: He exhibits distension. He exhibits no mass. Bowel sounds are decreased. There is abdominal tenderness. There is no guarding.   Musculoskeletal:         General: No edema.   Neurological: He is alert and oriented to person, place, and time.   Skin: Skin is warm.   Psychiatric: He has a normal mood and affect.       Labs:  Recent Labs     06/16/20  1740   HLPBQ65L 7.28*   [id number] 30.9   [id number] 82.8*   NYZL2MFP 95.4*   ARTHCO3 14*   FIO2 60   ARTBE -11*         Recent Labs     06/16/20  2310 06/17/20  0540 06/17/20  0800 06/18/20  0435 06/18/20  0729 06/18/20  1304   SODIUM 129* 129*  --   --  128*  --   --    POTASSIUM 4.2 3.9  --    < > 3.8 3.6 3.4*   CHLORIDE 103 104  --   --  100  --   --    CO2 15* 18*  --   --  21  --   --    BUN 27* 20  --   --  9  --   --    CREATININE 1.16 0.83  --   --  0.59  --   --    MAGNESIUM 1.8  --  2.1  --  2.2  --   --    CALCIUM 7.1* 7.3*  --   --  7.3*  --   --     < > = values in this interval not displayed.     Recent Labs     06/16/20  0605 06/16/20 2310 06/17/20 0540 06/17/20  0800 06/18/20  0435   ALTSGPT 48  --   --   --   --  25   ASTSGOT 65*  --   --   --   --  46*   ALKPHOSPHAT 67  --   --   --   --  45   TBILIRUBIN 1.8*  --   --   --   --  1.4   LIPASE 5269*  --   --   --  811* 420*   PREALBUMIN  --   --   --   --   --  10.0*   GLUCOSE 208*   < > 162* 125*  --  137*    < > = values in this interval not displayed.     Recent Labs     06/17/20 0540 06/17/20  0800 06/18/20  0435   RBC 4.62* 4.65* 3.78*   HEMOGLOBIN 15.4 15.4 12.6*   HEMATOCRIT 45.8 46.4 38.2*   PLATELETCT 124* 134* 112*     Recent Labs     06/16/20  0605 06/17/20  0540 06/17/20  0800 06/18/20  0435   WBC 8.2 7.3 7.2 7.5   NEUTSPOLYS 50 77.70* 70.20 66.10   LYMPHOCYTES 13* 13.20* 14.90* 14.80*   MONOCYTES 6 6.60 7.90 12.20   EOSINOPHILS  --  1.70 0.00  1.70   BASOPHILS  --  0.00 0.00 0.00   ASTSGOT 65*  --   --  46*   ALTSGPT 48  --   --  25   ALKPHOSPHAT 67  --   --  45   TBILIRUBIN 1.8*  --   --  1.4       Assessment:  34-year-old male with acute pancreatitis secondary to alcohol and hyper triglyceridemia.  He is clinically improving.  Biochemical profile also improving with significant decrease in lipase.  Continue pain control  IV hydration and supportive care.  Nasoenteric feeding should be considered if unable to resume p.o. within the next 24 to 48 hours.    Problems:  1, acute pancreatitis  2.  Hypertriglyceridemia  3.  Hyponatremia    Plan:  1.  IV fluids and pain control per ICU team  2.  Monitor labs  3.  Start p.o. when pain is under control  4.  Consider enteral feeding if unable to start p.o. within the next 24 to 48 hours  5.  Patient counseled to abstain from alcohol forever    Quality-Core Measures

## 2020-06-19 NOTE — CARE PLAN
Problem: Safety  Goal: Will remain free from falls  Outcome: PROGRESSING AS EXPECTED  Intervention: Assess risk factors for falls  Flowsheets (Taken 6/19/2020 0800)  Fall Risk: Risk to Fall -  0 - 1 point  History of fall: 0  Mobility Status Assessment: 0-Ambulates & Transfers Independently. No Assistance Required  Risk for Injury-Any positive answers results in the pt being at high risk for fall related injury: Alcohol Abuse  Intervention: Implement fall precautions  Flowsheets (Taken 6/19/2020 0800)  Bed Alarm: Yes - Alarm On  Bedrails: (pt up to commode) Alternative to Bedrails Used  Chair/Bed Strip Alarm: Yes - Alarm On     Problem: Psychosocial Needs:  Goal: Level of anxiety will decrease  Outcome: PROGRESSING AS EXPECTED  Intervention: Identify and develop with patient strategies to cope with anxiety triggers  Note: Pt attributes anxiety and restlessness to pain. Pt using breathing techniques, distraction and rest to help with pain.  Intervention: Collaborate with Interdisciplinary Team including Psychologist/Behavioral Health Team  Note: Titrating dex down with goal of being off by tomorrow.

## 2020-06-19 NOTE — CARE PLAN
Problem: Communication  Goal: The ability to communicate needs accurately and effectively will improve  6/18/2020 1741 by Stephanie Cain R.N.  Outcome: PROGRESSING AS EXPECTED  6/18/2020 1740 by Stephanie Cain R.N.  Outcome: PROGRESSING SLOWER THAN EXPECTED     Problem: Infection  Goal: Will remain free from infection  6/18/2020 1741 by Stephanie Cain R.N.  Outcome: PROGRESSING AS EXPECTED  6/18/2020 1740 by Stephanie Cain R.N.  Outcome: PROGRESSING AS EXPECTED     Problem: Venous Thromboembolism (VTW)/Deep Vein Thrombosis (DVT) Prevention:  Goal: Patient will participate in Venous Thrombosis (VTE)/Deep Vein Thrombosis (DVT)Prevention Measures  6/18/2020 1741 by Stephanie Cain R.N.  Outcome: PROGRESSING AS EXPECTED  6/18/2020 1740 by Stephanie Cain R.N.  Outcome: PROGRESSING AS EXPECTED     Problem: Bowel/Gastric:  Goal: Normal bowel function is maintained or improved  6/18/2020 1741 by Stephanie Cain R.DIAMOND.  Outcome: PROGRESSING AS EXPECTED  6/18/2020 1740 by Stephanie Cain R.N.  Outcome: PROGRESSING AS EXPECTED  Goal: Will not experience complications related to bowel motility  Outcome: PROGRESSING AS EXPECTED

## 2020-06-20 VITALS
OXYGEN SATURATION: 93 % | BODY MASS INDEX: 36.7 KG/M2 | TEMPERATURE: 98.2 F | HEART RATE: 121 BPM | DIASTOLIC BLOOD PRESSURE: 80 MMHG | HEIGHT: 69 IN | RESPIRATION RATE: 26 BRPM | WEIGHT: 247.8 LBS | SYSTOLIC BLOOD PRESSURE: 151 MMHG

## 2020-06-20 PROBLEM — D72.829 LEUKOCYTOSIS: Status: RESOLVED | Noted: 2020-06-15 | Resolved: 2020-06-20

## 2020-06-20 PROBLEM — K85.90 ACUTE PANCREATITIS: Status: RESOLVED | Noted: 2020-06-15 | Resolved: 2020-06-20

## 2020-06-20 PROBLEM — E83.51 HYPOCALCEMIA: Status: RESOLVED | Noted: 2020-06-16 | Resolved: 2020-06-20

## 2020-06-20 LAB
ALBUMIN SERPL BCP-MCNC: 3.2 G/DL (ref 3.2–4.9)
ALBUMIN/GLOB SERPL: 0.8 G/DL
ALP SERPL-CCNC: 69 U/L (ref 30–99)
ALT SERPL-CCNC: 24 U/L (ref 2–50)
ANION GAP SERPL CALC-SCNC: 12 MMOL/L (ref 7–16)
ANISOCYTOSIS BLD QL SMEAR: ABNORMAL
AST SERPL-CCNC: 31 U/L (ref 12–45)
BASOPHILS # BLD AUTO: 0 % (ref 0–1.8)
BASOPHILS # BLD: 0 K/UL (ref 0–0.12)
BILIRUB SERPL-MCNC: 1.4 MG/DL (ref 0.1–1.5)
BUN SERPL-MCNC: 7 MG/DL (ref 8–22)
CA-I SERPL-SCNC: 1.1 MMOL/L (ref 1.1–1.3)
CALCIUM SERPL-MCNC: 8.2 MG/DL (ref 8.5–10.5)
CHLORIDE SERPL-SCNC: 95 MMOL/L (ref 96–112)
CO2 SERPL-SCNC: 24 MMOL/L (ref 20–33)
CREAT SERPL-MCNC: 0.6 MG/DL (ref 0.5–1.4)
EOSINOPHIL # BLD AUTO: 0.1 K/UL (ref 0–0.51)
EOSINOPHIL NFR BLD: 0.9 % (ref 0–6.9)
ERYTHROCYTE [DISTWIDTH] IN BLOOD BY AUTOMATED COUNT: 52.8 FL (ref 35.9–50)
GIANT PLATELETS BLD QL SMEAR: NORMAL
GLOBULIN SER CALC-MCNC: 3.8 G/DL (ref 1.9–3.5)
GLUCOSE SERPL-MCNC: 127 MG/DL (ref 65–99)
HCT VFR BLD AUTO: 40.4 % (ref 42–52)
HGB BLD-MCNC: 13.2 G/DL (ref 14–18)
LIPASE SERPL-CCNC: 124 U/L (ref 11–82)
LYMPHOCYTES # BLD AUTO: 1.86 K/UL (ref 1–4.8)
LYMPHOCYTES NFR BLD: 17.2 % (ref 22–41)
MACROCYTES BLD QL SMEAR: ABNORMAL
MAGNESIUM SERPL-MCNC: 2.5 MG/DL (ref 1.5–2.5)
MANUAL DIFF BLD: ABNORMAL
MCH RBC QN AUTO: 33.2 PG (ref 27–33)
MCHC RBC AUTO-ENTMCNC: 32.7 G/DL (ref 33.7–35.3)
MCV RBC AUTO: 101.5 FL (ref 81.4–97.8)
METAMYELOCYTES NFR BLD MANUAL: 0.9 %
MICROCYTES BLD QL SMEAR: ABNORMAL
MONOCYTES # BLD AUTO: 2.14 K/UL (ref 0–0.85)
MONOCYTES NFR BLD AUTO: 19.8 % (ref 0–13.4)
MORPHOLOGY BLD-IMP: NORMAL
NEUTROPHILS # BLD AUTO: 6.61 K/UL (ref 1.82–7.42)
NEUTROPHILS NFR BLD: 50 % (ref 44–72)
NEUTS BAND NFR BLD MANUAL: 11.2 % (ref 0–10)
NRBC # BLD AUTO: 0.05 K/UL
NRBC BLD-RTO: 0.5 /100 WBC
PLATELET # BLD AUTO: 145 K/UL (ref 164–446)
PLATELET BLD QL SMEAR: NORMAL
PMV BLD AUTO: 9.6 FL (ref 9–12.9)
POLYCHROMASIA BLD QL SMEAR: NORMAL
POTASSIUM SERPL-SCNC: 3.1 MMOL/L (ref 3.6–5.5)
PROT SERPL-MCNC: 7 G/DL (ref 6–8.2)
RBC # BLD AUTO: 3.98 M/UL (ref 4.7–6.1)
RBC BLD AUTO: PRESENT
SODIUM SERPL-SCNC: 131 MMOL/L (ref 135–145)
STOMATOCYTES BLD QL SMEAR: NORMAL
TRIGL SERPL-MCNC: 276 MG/DL (ref 0–149)
WBC # BLD AUTO: 10.8 K/UL (ref 4.8–10.8)

## 2020-06-20 PROCEDURE — 82330 ASSAY OF CALCIUM: CPT

## 2020-06-20 PROCEDURE — 700102 HCHG RX REV CODE 250 W/ 637 OVERRIDE(OP): Performed by: INTERNAL MEDICINE

## 2020-06-20 PROCEDURE — 83735 ASSAY OF MAGNESIUM: CPT

## 2020-06-20 PROCEDURE — 700111 HCHG RX REV CODE 636 W/ 250 OVERRIDE (IP): Performed by: INTERNAL MEDICINE

## 2020-06-20 PROCEDURE — 85027 COMPLETE CBC AUTOMATED: CPT

## 2020-06-20 PROCEDURE — 80053 COMPREHEN METABOLIC PANEL: CPT

## 2020-06-20 PROCEDURE — 700105 HCHG RX REV CODE 258: Performed by: INTERNAL MEDICINE

## 2020-06-20 PROCEDURE — 99239 HOSP IP/OBS DSCHRG MGMT >30: CPT | Performed by: HOSPITALIST

## 2020-06-20 PROCEDURE — 99233 SBSQ HOSP IP/OBS HIGH 50: CPT | Performed by: INTERNAL MEDICINE

## 2020-06-20 PROCEDURE — 84478 ASSAY OF TRIGLYCERIDES: CPT

## 2020-06-20 PROCEDURE — A9270 NON-COVERED ITEM OR SERVICE: HCPCS | Performed by: INTERNAL MEDICINE

## 2020-06-20 PROCEDURE — 83690 ASSAY OF LIPASE: CPT

## 2020-06-20 PROCEDURE — 85007 BL SMEAR W/DIFF WBC COUNT: CPT

## 2020-06-20 PROCEDURE — 700111 HCHG RX REV CODE 636 W/ 250 OVERRIDE (IP): Performed by: PSYCHIATRY & NEUROLOGY

## 2020-06-20 RX ORDER — FUROSEMIDE 10 MG/ML
40 INJECTION INTRAMUSCULAR; INTRAVENOUS ONCE
Status: DISCONTINUED | OUTPATIENT
Start: 2020-06-20 | End: 2020-06-20 | Stop reason: HOSPADM

## 2020-06-20 RX ORDER — AMOXICILLIN AND CLAVULANATE POTASSIUM 875; 125 MG/1; MG/1
1 TABLET, FILM COATED ORAL 2 TIMES DAILY
Qty: 6 TAB | Refills: 0 | Status: SHIPPED | OUTPATIENT
Start: 2020-06-20 | End: 2021-04-30

## 2020-06-20 RX ORDER — POTASSIUM CHLORIDE 20 MEQ/1
40 TABLET, EXTENDED RELEASE ORAL ONCE
Status: COMPLETED | OUTPATIENT
Start: 2020-06-20 | End: 2020-06-20

## 2020-06-20 RX ORDER — FENOFIBRATE 145 MG/1
145 TABLET, COATED ORAL DAILY
Qty: 30 TAB | Refills: 2 | Status: SHIPPED | OUTPATIENT
Start: 2020-06-20 | End: 2021-03-22 | Stop reason: SDUPTHER

## 2020-06-20 RX ORDER — POTASSIUM CHLORIDE 29.8 MG/ML
40 INJECTION INTRAVENOUS ONCE
Status: DISCONTINUED | OUTPATIENT
Start: 2020-06-20 | End: 2020-06-20

## 2020-06-20 RX ORDER — LISINOPRIL 5 MG/1
5 TABLET ORAL DAILY
Qty: 30 TAB | Refills: 2 | Status: SHIPPED | OUTPATIENT
Start: 2020-06-20 | End: 2021-03-22 | Stop reason: SDUPTHER

## 2020-06-20 RX ADMIN — OXYCODONE HYDROCHLORIDE 10 MG: 10 TABLET ORAL at 05:01

## 2020-06-20 RX ADMIN — AMLODIPINE BESYLATE 10 MG: 10 TABLET ORAL at 05:01

## 2020-06-20 RX ADMIN — POTASSIUM CHLORIDE 40 MEQ: 1500 TABLET, EXTENDED RELEASE ORAL at 09:41

## 2020-06-20 RX ADMIN — ENOXAPARIN SODIUM 40 MG: 40 INJECTION SUBCUTANEOUS at 05:01

## 2020-06-20 RX ADMIN — Medication 100 MG: at 05:01

## 2020-06-20 RX ADMIN — FAMOTIDINE 20 MG: 10 INJECTION, SOLUTION INTRAVENOUS at 05:01

## 2020-06-20 RX ADMIN — OXYCODONE HYDROCHLORIDE 10 MG: 10 TABLET ORAL at 00:09

## 2020-06-20 RX ADMIN — FOLIC ACID 1 MG: 1 TABLET ORAL at 05:01

## 2020-06-20 RX ADMIN — FENTANYL CITRATE 50 MCG: 50 INJECTION INTRAMUSCULAR; INTRAVENOUS at 03:07

## 2020-06-20 RX ADMIN — THERA TABS 1 TABLET: TAB at 05:01

## 2020-06-20 RX ADMIN — PIPERACILLIN AND TAZOBACTAM 4.5 G: 4; .5 INJECTION, POWDER, LYOPHILIZED, FOR SOLUTION INTRAVENOUS; PARENTERAL at 05:02

## 2020-06-20 ASSESSMENT — ENCOUNTER SYMPTOMS
VOMITING: 0
CHILLS: 0
PALPITATIONS: 0
SPUTUM PRODUCTION: 0
ABDOMINAL PAIN: 1
NAUSEA: 0
FOCAL WEAKNESS: 0
BLURRED VISION: 0
FEVER: 0
SHORTNESS OF BREATH: 0
DEPRESSION: 0

## 2020-06-20 NOTE — DISCHARGE INSTRUCTIONS
Discharge Instructions    Discharged to home by car with relative. Discharged via wheelchair, hospital escort: Yes.  Special equipment needed: Not Applicable    Be sure to schedule a follow-up appointment with your primary care doctor or any specialists as instructed.     Discharge Plan:   Smoking Cessation Offered: Patient Refused    I understand that a diet low in cholesterol, fat, and sodium is recommended for good health. Unless I have been given specific instructions below for another diet, I accept this instruction as my diet prescription.   Other diet: low fat    Special Instructions: None    · Is patient discharged on Warfarin / Coumadin?   No     Depression / Suicide Risk    As you are discharged from this [facility] facility, it is important to learn how to keep safe from harming yourself.    Recognize the warning signs:  · Abrupt changes in personality, positive or negative- including increase in energy   · Giving away possessions  · Change in eating patterns- significant weight changes-  positive or negative  · Change in sleeping patterns- unable to sleep or sleeping all the time   · Unwillingness or inability to communicate  · Depression  · Unusual sadness, discouragement and loneliness  · Talk of wanting to die  · Neglect of personal appearance   · Rebelliousness- reckless behavior  · Withdrawal from people/activities they love  · Confusion- inability to concentrate     If you or a loved one observes any of these behaviors or has concerns about self-harm, here's what you can do:  · Talk about it- your feelings and reasons for harming yourself  · Remove any means that you might use to hurt yourself (examples: pills, rope, extension cords, firearm)  · Get professional help from the community (Mental Health, Substance Abuse, psychological counseling)  · Do not be alone:Call your Safe Contact- someone whom you trust who will be there for you.  · Call your local CRISIS HOTLINE 314-5382 or  673-461-7671  · Call your local Children's Mobile Crisis Response Team [place] (157) 976-7576 or www.[employer]  · Call the toll free National Suicide Prevention Hotlines   · National Suicide Prevention Lifeline 560-097-UCQV (8219)  · National [employer]-sensing Line Network 800-SUICIDE (768-9121)      Discharge Instructions per [place] D.O.      DIET: low fat diet    ACTIVITY: as tolerates    DIAGNOSIS: acute pancreatitis likely due to elevated triglycerides    Return to ER if any recurrence of abdominal pain.

## 2020-06-20 NOTE — DISCHARGE SUMMARY
Discharge Summary    CHIEF COMPLAINT ON ADMISSION  No chief complaint on file.      Reason for Admission  Pancreatitis     Admission Date  6/16/2020    CODE STATUS  FULL    HPI & HOSPITAL COURSE  This is a 34 y.o. male here with acute pancreatitis.  He is overweight and was found to have elevated triglyceride level of 1200 and sent to [facility] for possible plasma exchange.  He was given IV fluids and had remarkable improvement and was started on a low fat diet and tolerated it well.  Discussion of weight loss and lifestyle changes were discussed.  He was started on Tricor.  Food items to avoid for the short term were discussed.  He denies any heavy alcohol use and this was encourage to stop altogether for now.  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  His triglycerides did downtrend during his stay.  The MRI abdoment should pancreatitis but no other exact anatomic cause found.       Therefore, he is discharged in good and stable condition to home with close outpatient follow-up.    The patient met 2-midnight criteria for an inpatient stay at the time of discharge.    Discharge Date  6/20/2020    FOLLOW UP ITEMS POST DISCHARGE  None    DISCHARGE DIAGNOSES  Active Problems:    Hyponatremia POA: Yes    Alcohol use POA: Yes    Hypertriglyceridemia POA: Unknown    Hypertension POA: Unknown    Tobacco abuse POA: Unknown  Resolved Problems:    Acute pancreatitis POA: Yes    Leukocytosis POA: Yes    Hypocalcemia POA: Unknown      FOLLOW UP  No future appointments.  your primary care provider in [place]    Schedule an appointment as soon as possible for a visit in 3 weeks        MEDICATIONS ON DISCHARGE     Medication List      START taking these medications      Instructions   amoxicillin-clavulanate 875-125 MG Tabs  Commonly known as:  AUGMENTIN   Take 1 Tab by mouth 2 times a day.  Dose:  1 Tab     fenofibrate 145 MG Tabs  Commonly known as:  TRICOR   Take 1 Tab by mouth every day.  Dose:  145 mg      lisinopril 5 MG Tabs  Commonly known as:  PRINIVIL   Take 1 Tab by mouth every day.  Dose:  5 mg            Allergies  No Known Allergies    DIET  Orders Placed This Encounter   Procedures   • Diet Order Regular     Standing Status:   Standing     Number of Occurrences:   1     Order Specific Question:   Diet:     Answer:   Regular [1]     Order Specific Question:   Macronutrient modifications:     Answer:   Low Fat [5]       ACTIVITY  As tolerated.  Weight bearing as tolerated    CONSULTATIONS  GI  Intensivist    PROCEDURES  None    LABORATORY  Lab Results   Component Value Date    SODIUM 131 (L) 06/20/2020    POTASSIUM 3.1 (L) 06/20/2020    CHLORIDE 95 (L) 06/20/2020    CO2 24 06/20/2020    GLUCOSE 127 (H) 06/20/2020    BUN 7 (L) 06/20/2020    CREATININE 0.60 06/20/2020        Lab Results   Component Value Date    WBC 10.8 06/20/2020    HEMOGLOBIN 13.2 (L) 06/20/2020    HEMATOCRIT 40.4 (L) 06/20/2020    PLATELETCT 145 (L) 06/20/2020        Total time of the discharge process exceeds 31 minutes.

## 2020-06-20 NOTE — PROGRESS NOTES
Critical Care Progress Note    Date of admission  6/16/2020    Chief Complaint  34 y.o. male admitted 6/16/2020 with acute pancreatitis    Hospital Course    34 y.o. male who presented 6/16/2020 as a tx from an OSH for acute pancreatitis. Symptoms of back N&V and abdominal pain started 6/15. He was admitted to the OSH and worked up and found to have elevated triglycerides. It was suspected that his pancreatitis was related to elevated triglycerides and alcohol. MRCP was performed to evaluate for obstruction but the CBD was not visualized. He was started on an insulin gtt at the OSH hospital and transferred in the event that he may need plasmapheresis. On arrival he is tachycardic with MAPS > 65 on room air, complaining of mild abdominal pain. Neph and Gi were contacted for consultation prior to arrival      Interval Problem Update  Reviewed last 24 hour events:  T-max 100.9  -3.8 L over last 24 hours, + 1.1 L since admit  No cxr this am  MRI abdomen suboptimal  Labs pending    Bcx 6/17 in process  MRSA nares 6/15 NEG    Zosyn 6/17-P    Precedex@pause    96% on 4 L  Last BM 6/19    Review of Systems  Review of Systems   Constitutional: Positive for malaise/fatigue. Negative for chills and fever.   HENT: Negative for congestion.    Eyes: Negative for blurred vision.   Respiratory: Negative for sputum production and shortness of breath.    Cardiovascular: Negative for chest pain and palpitations.   Gastrointestinal: Positive for abdominal pain (Improving). Negative for nausea and vomiting.   Neurological: Negative for focal weakness.   Psychiatric/Behavioral: Negative for depression.   All other systems reviewed and are negative.       Vital Signs for last 24 hours   Temp:  [36.7 °C (98.1 °F)-38.3 °C (100.9 °F)] 36.8 °C (98.2 °F)  Pulse:  [] 115  Resp:  [18-39] 20  BP: (121-173)/() 140/95  SpO2:  [86 %-99 %] 96 %    Hemodynamic parameters for last 24 hours       Respiratory Information for the last 24  hours       Physical Exam   Physical Exam  Vitals signs and nursing note reviewed.   Constitutional:       Appearance: He is ill-appearing. He is not diaphoretic.   HENT:      Head: Normocephalic and atraumatic.   Eyes:      Conjunctiva/sclera: Conjunctivae normal.      Pupils: Pupils are equal, round, and reactive to light.   Cardiovascular:      Rate and Rhythm: Tachycardia present.      Pulses: Normal pulses.   Pulmonary:      Breath sounds: No wheezing or rales.      Comments: Diminished  Abdominal:      General: There is distension.      Tenderness: There is no abdominal tenderness. There is no guarding or rebound.   Musculoskeletal:         General: Swelling present.   Skin:     General: Skin is warm and dry.   Neurological:      Mental Status: He is alert.      Cranial Nerves: No cranial nerve deficit.   Psychiatric:         Mood and Affect: Mood normal.         Medications  Current Facility-Administered Medications   Medication Dose Route Frequency Provider Last Rate Last Dose   • SODIUM CHLORIDE 0.9 % IV SOLN            • oxyCODONE immediate-release (ROXICODONE) tablet 5 mg  5 mg Oral Q4HRS PRN Molina Jones M.D.        Or   • oxyCODONE immediate release (ROXICODONE) tablet 10 mg  10 mg Oral Q4HRS PRN Molina Jones M.D.   10 mg at 06/20/20 0501   • thiamine tablet 100 mg  100 mg Oral DAILY Molina Jones M.D.   100 mg at 06/20/20 0501   • folic acid (FOLVITE) tablet 1 mg  1 mg Oral DAILY Molina Jones M.D.   1 mg at 06/20/20 0501   • multivitamin (THERAGRAN) tablet 1 Tab  1 Tab Oral DAILY Molina Jones M.D.   1 Tab at 06/20/20 0501   • lidocaine (LIDODERM) 5 % 1 Patch  1 Patch Transdermal Q24HR Molina Jones M.D.   1 Patch at 06/19/20 1240   • amLODIPine (NORVASC) tablet 10 mg  10 mg Oral Q DAY Molina Jones M.D.   10 mg at 06/20/20 0501   • dexmedetomidine (PRECEDEX) 400 mcg/100mL NS premix infusion  0.1-1.5 mcg/kg/hr Intravenous Continuous Molina Jones M.D.   Stopped at 06/19/20  1520   • nicotine (NICODERM) 14 MG/24HR 14 mg  14 mg Transdermal Daily-0600 Nery Burr M.D.   14 mg at 06/18/20 0513    And   • nicotine polacrilex (NICORETTE) 2 MG piece 2 mg  2 mg Oral Q HOUR PRN Nery Burr M.D.       • hydrALAZINE (APRESOLINE) injection 10-20 mg  10-20 mg Intravenous Q4HRS PRN Molina Jones M.D.       • labetalol (NORMODYNE/TRANDATE) injection 10-20 mg  10-20 mg Intravenous Q4HRS PRN Molina Jones M.D.   10 mg at 06/17/20 1508   • piperacillin-tazobactam (ZOSYN) 4.5 g in  mL IVPB  4.5 g Intravenous Q8HRS Molina Jones M.D. 25 mL/hr at 06/20/20 0502 4.5 g at 06/20/20 0502   • enoxaparin (LOVENOX) inj 40 mg  40 mg Subcutaneous DAILY Molina Jones M.D.   40 mg at 06/20/20 0501   • famotidine (PEPCID) injection 20 mg  20 mg Intravenous BID Molina Jones M.D.   20 mg at 06/20/20 0501   • Pharmacy Consult: Enteral tube insertion - review meds/change route/product selection  1 Each Other PHARMACY TO DOSE Molina Jones M.D.       • senna-docusate (PERICOLACE or SENOKOT S) 8.6-50 MG per tablet 2 Tab  2 Tab Enteral Tube BID Molina Jones M.D.   Stopped at 06/19/20 1800    And   • polyethylene glycol/lytes (MIRALAX) PACKET 1 Packet  1 Packet Enteral Tube QDAY PRN Molina Jones M.D.   1 Packet at 06/18/20 0003    And   • magnesium hydroxide (MILK OF MAGNESIA) suspension 30 mL  30 mL Enteral Tube QDAY PRN Molina Jones M.D.        And   • bisacodyl (DULCOLAX) suppository 10 mg  10 mg Rectal QDAY PRN Molina Jones M.D.       • MD Alert...ICU Electrolyte Replacement per Pharmacy   Other PHARMACY TO DOSE aCrter Ferrer M.D.       • fentaNYL (SUBLIMAZE) injection 25-50 mcg  25-50 mcg Intravenous Q2HRS PRN Carter Ferrer M.D.   50 mcg at 06/20/20 0307       Fluids    Intake/Output Summary (Last 24 hours) at 6/20/2020 0636  Last data filed at 6/20/2020 0502  Gross per 24 hour   Intake 3037.12 ml   Output 6825 ml   Net -3787.88 ml       Laboratory          Recent Labs      06/17/20  0800 06/18/20 0435 06/18/20 2017 06/19/20 0128 06/19/20  0552   SODIUM  --   --  128*  --   --   --  134*   POTASSIUM  --    < > 3.8   < > 3.6 3.8 3.9   CHLORIDE  --   --  100  --   --   --  103   CO2  --   --  21  --   --   --  23   BUN  --   --  9  --   --   --  9   CREATININE  --   --  0.59  --   --   --  0.63   MAGNESIUM 2.1  --  2.2  --   --   --  2.3   CALCIUM  --   --  7.3*  --   --   --  7.8*    < > = values in this interval not displayed.     Recent Labs     06/17/20 0800 06/18/20 0435 06/19/20  0552   ALTSGPT  --  25 25   ASTSGOT  --  46* 38   ALKPHOSPHAT  --  45 54   TBILIRUBIN  --  1.4 1.2   LIPASE 811* 420* 155*   PREALBUMIN  --  10.0*  --    GLUCOSE  --  137* 95     Recent Labs     06/17/20 0800 06/18/20 0435 06/19/20  0552   WBC 7.2 7.5 6.9   NEUTSPOLYS 70.20 66.10 49.10   LYMPHOCYTES 14.90* 14.80* 15.50*   MONOCYTES 7.90 12.20 19.10*   EOSINOPHILS 0.00 1.70 7.30*   BASOPHILS 0.00 0.00 0.90   ASTSGOT  --  46* 38   ALTSGPT  --  25 25   ALKPHOSPHAT  --  45 54   TBILIRUBIN  --  1.4 1.2     Recent Labs     06/17/20 0800 06/18/20 0435 06/19/20  0552   RBC 4.65* 3.78* 3.42*   HEMOGLOBIN 15.4 12.6* 11.4*   HEMATOCRIT 46.4 38.2* 34.9*   PLATELETCT 134* 112* 111*       Imaging  X-Ray:  No film today    Assessment/Plan  Acute pancreatitis- (present on admission)  Assessment & Plan  Likely from TG and alcohol  Pain control  Advance diet to low-fat  Aggressive electrolyte optimization  Lasix 40 mg    Hypertension  Assessment & Plan  Continue amlodipine 10 mg daily  Off Cardene infusion  PRN's to maintain SBP less than 160    Hypocalcemia  Assessment & Plan  Likely 2/2 to pancreatitis  Monitor  Replete per protocol    Hypertriglyceridemia  Assessment & Plan  Familial?  Monitor and tx per above  Fenofibrate when clinically appropriate  Improving    Alcohol use- (present on admission)  Assessment & Plan  Note near daily beer consumption  Monitor for W/ds  Off  Precedex  Thiamine/folate/MVI    Hyponatremia- (present on admission)  Assessment & Plan  Friend    Leukocytosis- (present on admission)  Assessment & Plan  Improving  Complete 5-day course of antibiotics    Tobacco abuse  Assessment & Plan  Counseling when clinically appropriate       VTE:  Lovenox  Ulcer: H2 Antagonist  Lines: None    I have performed a physical exam and reviewed and updated ROS and Plan today (6/20/2020). In review of yesterday's note (6/19/2020), there are no changes except as documented above.     Discussed patient condition and risk of morbidity and/or mortality with RN, RT, Pharmacy, Patient and GI

## 2020-06-22 LAB
BACTERIA BLD CULT: NORMAL
BACTERIA BLD CULT: NORMAL
SIGNIFICANT IND 70042: NORMAL
SIGNIFICANT IND 70042: NORMAL
SITE SITE: NORMAL
SITE SITE: NORMAL
SOURCE SOURCE: NORMAL
SOURCE SOURCE: NORMAL

## 2021-04-13 PROBLEM — E11.9 TYPE 2 DIABETES MELLITUS WITHOUT COMPLICATION, WITHOUT LONG-TERM CURRENT USE OF INSULIN (HCC): Status: ACTIVE | Noted: 2021-04-13

## 2021-04-13 PROBLEM — R73.09 HEMOGLOBIN A1C GREATER THAN 9.0%: Status: ACTIVE | Noted: 2021-04-13

## 2022-07-31 ENCOUNTER — HOSPITAL ENCOUNTER (OUTPATIENT)
Dept: RADIOLOGY | Facility: MEDICAL CENTER | Age: 37
End: 2022-07-31

## 2022-07-31 ENCOUNTER — ANESTHESIA EVENT (OUTPATIENT)
Dept: SURGERY | Facility: MEDICAL CENTER | Age: 37
DRG: 144 | End: 2022-07-31
Payer: COMMERCIAL

## 2022-07-31 ENCOUNTER — HOSPITAL ENCOUNTER (INPATIENT)
Facility: MEDICAL CENTER | Age: 37
LOS: 4 days | DRG: 144 | End: 2022-08-04
Attending: EMERGENCY MEDICINE | Admitting: INTERNAL MEDICINE
Payer: COMMERCIAL

## 2022-07-31 ENCOUNTER — ANESTHESIA (OUTPATIENT)
Dept: SURGERY | Facility: MEDICAL CENTER | Age: 37
DRG: 144 | End: 2022-07-31
Payer: COMMERCIAL

## 2022-07-31 ENCOUNTER — APPOINTMENT (OUTPATIENT)
Dept: RADIOLOGY | Facility: MEDICAL CENTER | Age: 37
DRG: 144 | End: 2022-07-31
Attending: EMERGENCY MEDICINE
Payer: COMMERCIAL

## 2022-07-31 DIAGNOSIS — E10.10 DKA, TYPE 1, NOT AT GOAL (HCC): ICD-10-CM

## 2022-07-31 DIAGNOSIS — L02.91 ABSCESS: ICD-10-CM

## 2022-07-31 PROBLEM — E87.20 METABOLIC ACIDOSIS: Status: ACTIVE | Noted: 2022-07-31

## 2022-07-31 LAB
ALBUMIN SERPL BCP-MCNC: 3.8 G/DL (ref 3.2–4.9)
ALBUMIN/GLOB SERPL: 1.5 G/DL
ALP SERPL-CCNC: 86 U/L (ref 30–99)
ALT SERPL-CCNC: 40 U/L (ref 2–50)
ANION GAP SERPL CALC-SCNC: 24 MMOL/L (ref 7–16)
AST SERPL-CCNC: 29 U/L (ref 12–45)
BASOPHILS # BLD AUTO: 0.5 % (ref 0–1.8)
BASOPHILS # BLD: 0.05 K/UL (ref 0–0.12)
BILIRUB SERPL-MCNC: 1.3 MG/DL (ref 0.1–1.5)
BUN SERPL-MCNC: 7 MG/DL (ref 8–22)
CALCIUM SERPL-MCNC: 7.8 MG/DL (ref 8.5–10.5)
CHLORIDE SERPL-SCNC: 101 MMOL/L (ref 96–112)
CO2 SERPL-SCNC: 14 MMOL/L (ref 20–33)
CREAT SERPL-MCNC: 0.39 MG/DL (ref 0.5–1.4)
EOSINOPHIL # BLD AUTO: 0.02 K/UL (ref 0–0.51)
EOSINOPHIL NFR BLD: 0.2 % (ref 0–6.9)
ERYTHROCYTE [DISTWIDTH] IN BLOOD BY AUTOMATED COUNT: 43.8 FL (ref 35.9–50)
GFR SERPLBLD CREATININE-BSD FMLA CKD-EPI: 145 ML/MIN/1.73 M 2
GLOBULIN SER CALC-MCNC: 2.6 G/DL (ref 1.9–3.5)
GLUCOSE BLD STRIP.AUTO-MCNC: 197 MG/DL (ref 65–99)
GLUCOSE SERPL-MCNC: 173 MG/DL (ref 65–99)
HCT VFR BLD AUTO: 39.9 % (ref 42–52)
HGB BLD-MCNC: 14.3 G/DL (ref 14–18)
IMM GRANULOCYTES # BLD AUTO: 0.04 K/UL (ref 0–0.11)
IMM GRANULOCYTES NFR BLD AUTO: 0.4 % (ref 0–0.9)
LYMPHOCYTES # BLD AUTO: 1.43 K/UL (ref 1–4.8)
LYMPHOCYTES NFR BLD: 15.5 % (ref 22–41)
MCH RBC QN AUTO: 34.2 PG (ref 27–33)
MCHC RBC AUTO-ENTMCNC: 35.8 G/DL (ref 33.7–35.3)
MCV RBC AUTO: 95.5 FL (ref 81.4–97.8)
MONOCYTES # BLD AUTO: 1.18 K/UL (ref 0–0.85)
MONOCYTES NFR BLD AUTO: 12.8 % (ref 0–13.4)
NEUTROPHILS # BLD AUTO: 6.53 K/UL (ref 1.82–7.42)
NEUTROPHILS NFR BLD: 70.6 % (ref 44–72)
NRBC # BLD AUTO: 0 K/UL
NRBC BLD-RTO: 0 /100 WBC
PLATELET # BLD AUTO: 121 K/UL (ref 164–446)
PMV BLD AUTO: 10.5 FL (ref 9–12.9)
POTASSIUM SERPL-SCNC: 3.3 MMOL/L (ref 3.6–5.5)
PROT SERPL-MCNC: 6.4 G/DL (ref 6–8.2)
RBC # BLD AUTO: 4.18 M/UL (ref 4.7–6.1)
SODIUM SERPL-SCNC: 139 MMOL/L (ref 135–145)
WBC # BLD AUTO: 9.3 K/UL (ref 4.8–10.8)

## 2022-07-31 PROCEDURE — 770006 HCHG ROOM/CARE - MED/SURG/GYN SEMI*

## 2022-07-31 PROCEDURE — 160048 HCHG OR STATISTICAL LEVEL 1-5: Performed by: DENTIST

## 2022-07-31 PROCEDURE — 0C9M7ZZ DRAINAGE OF PHARYNX, VIA NATURAL OR ARTIFICIAL OPENING: ICD-10-PCS | Performed by: DENTIST

## 2022-07-31 PROCEDURE — 160028 HCHG SURGERY MINUTES - 1ST 30 MINS LEVEL 3: Performed by: DENTIST

## 2022-07-31 PROCEDURE — 160039 HCHG SURGERY MINUTES - EA ADDL 1 MIN LEVEL 3: Performed by: DENTIST

## 2022-07-31 PROCEDURE — 700102 HCHG RX REV CODE 250 W/ 637 OVERRIDE(OP): Performed by: INTERNAL MEDICINE

## 2022-07-31 PROCEDURE — 700111 HCHG RX REV CODE 636 W/ 250 OVERRIDE (IP): Performed by: EMERGENCY MEDICINE

## 2022-07-31 PROCEDURE — 160009 HCHG ANES TIME/MIN: Performed by: DENTIST

## 2022-07-31 PROCEDURE — 99222 1ST HOSP IP/OBS MODERATE 55: CPT | Performed by: INTERNAL MEDICINE

## 2022-07-31 PROCEDURE — 36415 COLL VENOUS BLD VENIPUNCTURE: CPT

## 2022-07-31 PROCEDURE — 700111 HCHG RX REV CODE 636 W/ 250 OVERRIDE (IP): Performed by: ANESTHESIOLOGY

## 2022-07-31 PROCEDURE — 700101 HCHG RX REV CODE 250: Performed by: ANESTHESIOLOGY

## 2022-07-31 PROCEDURE — 87040 BLOOD CULTURE FOR BACTERIA: CPT

## 2022-07-31 PROCEDURE — 160002 HCHG RECOVERY MINUTES (STAT): Performed by: DENTIST

## 2022-07-31 PROCEDURE — 85025 COMPLETE CBC W/AUTO DIFF WBC: CPT

## 2022-07-31 PROCEDURE — 87070 CULTURE OTHR SPECIMN AEROBIC: CPT

## 2022-07-31 PROCEDURE — 80053 COMPREHEN METABOLIC PANEL: CPT

## 2022-07-31 PROCEDURE — 87205 SMEAR GRAM STAIN: CPT

## 2022-07-31 PROCEDURE — 700105 HCHG RX REV CODE 258: Performed by: INTERNAL MEDICINE

## 2022-07-31 PROCEDURE — 87075 CULTR BACTERIA EXCEPT BLOOD: CPT

## 2022-07-31 PROCEDURE — 96365 THER/PROPH/DIAG IV INF INIT: CPT

## 2022-07-31 PROCEDURE — 00170 ANES INTRAORAL PX NOS: CPT | Performed by: ANESTHESIOLOGY

## 2022-07-31 PROCEDURE — 70355 PANORAMIC X-RAY OF JAWS: CPT

## 2022-07-31 PROCEDURE — 700105 HCHG RX REV CODE 258: Performed by: ANESTHESIOLOGY

## 2022-07-31 PROCEDURE — 700111 HCHG RX REV CODE 636 W/ 250 OVERRIDE (IP): Performed by: INTERNAL MEDICINE

## 2022-07-31 PROCEDURE — 160035 HCHG PACU - 1ST 60 MINS PHASE I: Performed by: DENTIST

## 2022-07-31 PROCEDURE — 82962 GLUCOSE BLOOD TEST: CPT

## 2022-07-31 PROCEDURE — 160036 HCHG PACU - EA ADDL 30 MINS PHASE I: Performed by: DENTIST

## 2022-07-31 PROCEDURE — 99285 EMERGENCY DEPT VISIT HI MDM: CPT

## 2022-07-31 PROCEDURE — 96375 TX/PRO/DX INJ NEW DRUG ADDON: CPT

## 2022-07-31 PROCEDURE — 700101 HCHG RX REV CODE 250: Performed by: DENTIST

## 2022-07-31 PROCEDURE — 0CDXXZ0 EXTRACTION OF LOWER TOOTH, SINGLE, EXTERNAL APPROACH: ICD-10-PCS | Performed by: DENTIST

## 2022-07-31 PROCEDURE — 87077 CULTURE AEROBIC IDENTIFY: CPT

## 2022-07-31 RX ORDER — AMLODIPINE BESYLATE 5 MG/1
5 TABLET ORAL DAILY
Status: DISCONTINUED | OUTPATIENT
Start: 2022-07-31 | End: 2022-07-31

## 2022-07-31 RX ORDER — SODIUM CHLORIDE 9 MG/ML
INJECTION, SOLUTION INTRAVENOUS CONTINUOUS
Status: DISCONTINUED | OUTPATIENT
Start: 2022-07-31 | End: 2022-08-01

## 2022-07-31 RX ORDER — ONDANSETRON 2 MG/ML
4 INJECTION INTRAMUSCULAR; INTRAVENOUS
Status: DISCONTINUED | OUTPATIENT
Start: 2022-07-31 | End: 2022-07-31 | Stop reason: HOSPADM

## 2022-07-31 RX ORDER — POLYETHYLENE GLYCOL 3350 17 G/17G
1 POWDER, FOR SOLUTION ORAL
Status: DISCONTINUED | OUTPATIENT
Start: 2022-07-31 | End: 2022-08-04 | Stop reason: HOSPADM

## 2022-07-31 RX ORDER — ONDANSETRON 2 MG/ML
INJECTION INTRAMUSCULAR; INTRAVENOUS PRN
Status: DISCONTINUED | OUTPATIENT
Start: 2022-07-31 | End: 2022-07-31 | Stop reason: SURG

## 2022-07-31 RX ORDER — PROMETHAZINE HYDROCHLORIDE 25 MG/1
12.5-25 TABLET ORAL EVERY 4 HOURS PRN
Status: DISCONTINUED | OUTPATIENT
Start: 2022-07-31 | End: 2022-08-04 | Stop reason: HOSPADM

## 2022-07-31 RX ORDER — LIDOCAINE HYDROCHLORIDE 20 MG/ML
INJECTION, SOLUTION INFILTRATION; PERINEURAL
Status: DISCONTINUED | OUTPATIENT
Start: 2022-07-31 | End: 2022-07-31 | Stop reason: HOSPADM

## 2022-07-31 RX ORDER — SODIUM CHLORIDE, SODIUM LACTATE, POTASSIUM CHLORIDE, CALCIUM CHLORIDE 600; 310; 30; 20 MG/100ML; MG/100ML; MG/100ML; MG/100ML
INJECTION, SOLUTION INTRAVENOUS
Status: DISCONTINUED | OUTPATIENT
Start: 2022-07-31 | End: 2022-07-31 | Stop reason: SURG

## 2022-07-31 RX ORDER — LISINOPRIL 5 MG/1
5 TABLET ORAL DAILY
Status: DISCONTINUED | OUTPATIENT
Start: 2022-07-31 | End: 2022-08-04 | Stop reason: HOSPADM

## 2022-07-31 RX ORDER — AMOXICILLIN 250 MG
2 CAPSULE ORAL 2 TIMES DAILY
Status: DISCONTINUED | OUTPATIENT
Start: 2022-07-31 | End: 2022-08-04 | Stop reason: HOSPADM

## 2022-07-31 RX ORDER — OXYCODONE HYDROCHLORIDE 5 MG/1
5 TABLET ORAL EVERY 4 HOURS PRN
Status: DISCONTINUED | OUTPATIENT
Start: 2022-07-31 | End: 2022-08-04 | Stop reason: HOSPADM

## 2022-07-31 RX ORDER — PROMETHAZINE HYDROCHLORIDE 25 MG/1
12.5-25 SUPPOSITORY RECTAL EVERY 4 HOURS PRN
Status: DISCONTINUED | OUTPATIENT
Start: 2022-07-31 | End: 2022-08-04 | Stop reason: HOSPADM

## 2022-07-31 RX ORDER — ALBUTEROL SULFATE 2.5 MG/3ML
2.5 SOLUTION RESPIRATORY (INHALATION)
Status: DISCONTINUED | OUTPATIENT
Start: 2022-07-31 | End: 2022-07-31 | Stop reason: HOSPADM

## 2022-07-31 RX ORDER — LABETALOL HYDROCHLORIDE 5 MG/ML
5 INJECTION, SOLUTION INTRAVENOUS
Status: DISCONTINUED | OUTPATIENT
Start: 2022-07-31 | End: 2022-07-31 | Stop reason: HOSPADM

## 2022-07-31 RX ORDER — HALOPERIDOL 5 MG/ML
1 INJECTION INTRAMUSCULAR
Status: DISCONTINUED | OUTPATIENT
Start: 2022-07-31 | End: 2022-07-31 | Stop reason: HOSPADM

## 2022-07-31 RX ORDER — ACETAMINOPHEN 325 MG/1
650 TABLET ORAL EVERY 6 HOURS PRN
Status: DISCONTINUED | OUTPATIENT
Start: 2022-07-31 | End: 2022-08-04 | Stop reason: HOSPADM

## 2022-07-31 RX ORDER — ONDANSETRON 2 MG/ML
4 INJECTION INTRAMUSCULAR; INTRAVENOUS EVERY 4 HOURS PRN
Status: DISCONTINUED | OUTPATIENT
Start: 2022-07-31 | End: 2022-08-04 | Stop reason: HOSPADM

## 2022-07-31 RX ORDER — ENOXAPARIN SODIUM 100 MG/ML
40 INJECTION SUBCUTANEOUS DAILY
Status: DISCONTINUED | OUTPATIENT
Start: 2022-07-31 | End: 2022-07-31

## 2022-07-31 RX ORDER — DEXTROSE MONOHYDRATE 25 G/50ML
25 INJECTION, SOLUTION INTRAVENOUS
Status: DISCONTINUED | OUTPATIENT
Start: 2022-07-31 | End: 2022-08-01

## 2022-07-31 RX ORDER — MIDAZOLAM HYDROCHLORIDE 1 MG/ML
INJECTION INTRAMUSCULAR; INTRAVENOUS PRN
Status: DISCONTINUED | OUTPATIENT
Start: 2022-07-31 | End: 2022-07-31 | Stop reason: SURG

## 2022-07-31 RX ORDER — ROCURONIUM BROMIDE 10 MG/ML
INJECTION, SOLUTION INTRAVENOUS PRN
Status: DISCONTINUED | OUTPATIENT
Start: 2022-07-31 | End: 2022-07-31 | Stop reason: SURG

## 2022-07-31 RX ORDER — ACETAMINOPHEN 500 MG
1000 TABLET ORAL EVERY 4 HOURS PRN
COMMUNITY
End: 2023-07-10

## 2022-07-31 RX ORDER — OXYCODONE HCL 5 MG/5 ML
10 SOLUTION, ORAL ORAL
Status: DISCONTINUED | OUTPATIENT
Start: 2022-07-31 | End: 2022-07-31 | Stop reason: HOSPADM

## 2022-07-31 RX ORDER — DEXAMETHASONE SODIUM PHOSPHATE 4 MG/ML
INJECTION, SOLUTION INTRA-ARTICULAR; INTRALESIONAL; INTRAMUSCULAR; INTRAVENOUS; SOFT TISSUE PRN
Status: DISCONTINUED | OUTPATIENT
Start: 2022-07-31 | End: 2022-07-31 | Stop reason: SURG

## 2022-07-31 RX ORDER — GLYCOPYRROLATE 0.2 MG/ML
INJECTION INTRAMUSCULAR; INTRAVENOUS PRN
Status: DISCONTINUED | OUTPATIENT
Start: 2022-07-31 | End: 2022-07-31 | Stop reason: SURG

## 2022-07-31 RX ORDER — MORPHINE SULFATE 4 MG/ML
2 INJECTION INTRAVENOUS EVERY 4 HOURS PRN
Status: DISPENSED | OUTPATIENT
Start: 2022-07-31 | End: 2022-08-02

## 2022-07-31 RX ORDER — SODIUM CHLORIDE, SODIUM LACTATE, POTASSIUM CHLORIDE, CALCIUM CHLORIDE 600; 310; 30; 20 MG/100ML; MG/100ML; MG/100ML; MG/100ML
INJECTION, SOLUTION INTRAVENOUS CONTINUOUS
Status: DISCONTINUED | OUTPATIENT
Start: 2022-07-31 | End: 2022-07-31 | Stop reason: HOSPADM

## 2022-07-31 RX ORDER — KETAMINE HYDROCHLORIDE 50 MG/ML
INJECTION, SOLUTION INTRAMUSCULAR; INTRAVENOUS PRN
Status: DISCONTINUED | OUTPATIENT
Start: 2022-07-31 | End: 2022-07-31 | Stop reason: SURG

## 2022-07-31 RX ORDER — ATORVASTATIN CALCIUM 10 MG/1
10 TABLET, FILM COATED ORAL DAILY
Status: DISCONTINUED | OUTPATIENT
Start: 2022-07-31 | End: 2022-07-31

## 2022-07-31 RX ORDER — DEXTROSE MONOHYDRATE 25 G/50ML
25 INJECTION, SOLUTION INTRAVENOUS
Status: DISCONTINUED | OUTPATIENT
Start: 2022-07-31 | End: 2022-07-31 | Stop reason: HOSPADM

## 2022-07-31 RX ORDER — DIPHENHYDRAMINE HYDROCHLORIDE 50 MG/ML
12.5 INJECTION INTRAMUSCULAR; INTRAVENOUS
Status: DISCONTINUED | OUTPATIENT
Start: 2022-07-31 | End: 2022-07-31 | Stop reason: HOSPADM

## 2022-07-31 RX ORDER — OXYCODONE HCL 5 MG/5 ML
5 SOLUTION, ORAL ORAL
Status: DISCONTINUED | OUTPATIENT
Start: 2022-07-31 | End: 2022-07-31 | Stop reason: HOSPADM

## 2022-07-31 RX ORDER — BISACODYL 10 MG
10 SUPPOSITORY, RECTAL RECTAL
Status: DISCONTINUED | OUTPATIENT
Start: 2022-07-31 | End: 2022-08-04 | Stop reason: HOSPADM

## 2022-07-31 RX ORDER — HYDRALAZINE HYDROCHLORIDE 20 MG/ML
5 INJECTION INTRAMUSCULAR; INTRAVENOUS
Status: DISCONTINUED | OUTPATIENT
Start: 2022-07-31 | End: 2022-07-31 | Stop reason: HOSPADM

## 2022-07-31 RX ORDER — CARVEDILOL 25 MG/1
25 TABLET ORAL 2 TIMES DAILY WITH MEALS
Status: DISCONTINUED | OUTPATIENT
Start: 2022-07-31 | End: 2022-07-31

## 2022-07-31 RX ORDER — PROCHLORPERAZINE EDISYLATE 5 MG/ML
5-10 INJECTION INTRAMUSCULAR; INTRAVENOUS EVERY 4 HOURS PRN
Status: DISCONTINUED | OUTPATIENT
Start: 2022-07-31 | End: 2022-08-04 | Stop reason: HOSPADM

## 2022-07-31 RX ORDER — ONDANSETRON 4 MG/1
4 TABLET, ORALLY DISINTEGRATING ORAL EVERY 4 HOURS PRN
Status: DISCONTINUED | OUTPATIENT
Start: 2022-07-31 | End: 2022-08-04 | Stop reason: HOSPADM

## 2022-07-31 RX ADMIN — SODIUM CHLORIDE 3 G: 900 INJECTION INTRAVENOUS at 11:08

## 2022-07-31 RX ADMIN — ONDANSETRON 4 MG: 2 INJECTION INTRAMUSCULAR; INTRAVENOUS at 21:42

## 2022-07-31 RX ADMIN — MIDAZOLAM HYDROCHLORIDE 2 MG: 1 INJECTION, SOLUTION INTRAMUSCULAR; INTRAVENOUS at 21:18

## 2022-07-31 RX ADMIN — PROPOFOL 40 MG: 10 INJECTION, EMULSION INTRAVENOUS at 21:22

## 2022-07-31 RX ADMIN — DEXAMETHASONE SODIUM PHOSPHATE 8 MG: 4 INJECTION, SOLUTION INTRA-ARTICULAR; INTRALESIONAL; INTRAMUSCULAR; INTRAVENOUS; SOFT TISSUE at 21:36

## 2022-07-31 RX ADMIN — FENTANYL CITRATE 25 MCG: 50 INJECTION, SOLUTION INTRAMUSCULAR; INTRAVENOUS at 22:50

## 2022-07-31 RX ADMIN — SODIUM CHLORIDE, POTASSIUM CHLORIDE, SODIUM LACTATE AND CALCIUM CHLORIDE: 600; 310; 30; 20 INJECTION, SOLUTION INTRAVENOUS at 21:16

## 2022-07-31 RX ADMIN — SODIUM CHLORIDE: 900 INJECTION INTRAVENOUS at 23:43

## 2022-07-31 RX ADMIN — SUGAMMADEX 200 MG: 100 INJECTION, SOLUTION INTRAVENOUS at 21:42

## 2022-07-31 RX ADMIN — FENTANYL CITRATE 50 MCG: 50 INJECTION, SOLUTION INTRAMUSCULAR; INTRAVENOUS at 21:28

## 2022-07-31 RX ADMIN — PROPOFOL 60 MG: 10 INJECTION, EMULSION INTRAVENOUS at 21:24

## 2022-07-31 RX ADMIN — ROCURONIUM BROMIDE 50 MG: 10 INJECTION, SOLUTION INTRAVENOUS at 21:24

## 2022-07-31 RX ADMIN — FENTANYL CITRATE 50 MCG: 50 INJECTION, SOLUTION INTRAMUSCULAR; INTRAVENOUS at 22:36

## 2022-07-31 RX ADMIN — KETAMINE HYDROCHLORIDE 25 MG: 50 INJECTION INTRAMUSCULAR; INTRAVENOUS at 21:20

## 2022-07-31 RX ADMIN — SODIUM CHLORIDE 3 G: 900 INJECTION INTRAVENOUS at 17:04

## 2022-07-31 RX ADMIN — FENTANYL CITRATE 50 MCG: 50 INJECTION, SOLUTION INTRAMUSCULAR; INTRAVENOUS at 09:32

## 2022-07-31 RX ADMIN — FENTANYL CITRATE 50 MCG: 50 INJECTION, SOLUTION INTRAMUSCULAR; INTRAVENOUS at 21:47

## 2022-07-31 RX ADMIN — GLYCOPYRROLATE 0.2 MG: 0.2 INJECTION INTRAMUSCULAR; INTRAVENOUS at 21:19

## 2022-07-31 RX ADMIN — MORPHINE SULFATE 2 MG: 4 INJECTION INTRAVENOUS at 17:03

## 2022-07-31 RX ADMIN — MORPHINE SULFATE 2 MG: 4 INJECTION INTRAVENOUS at 11:17

## 2022-07-31 RX ADMIN — MORPHINE SULFATE 2 MG: 4 INJECTION INTRAVENOUS at 23:55

## 2022-07-31 RX ADMIN — FENTANYL CITRATE 25 MCG: 50 INJECTION, SOLUTION INTRAMUSCULAR; INTRAVENOUS at 22:47

## 2022-07-31 RX ADMIN — SODIUM CHLORIDE: 900 INJECTION INTRAVENOUS at 12:03

## 2022-07-31 RX ADMIN — SODIUM CHLORIDE 3 G: 900 INJECTION INTRAVENOUS at 23:43

## 2022-07-31 ASSESSMENT — ENCOUNTER SYMPTOMS
CARDIOVASCULAR NEGATIVE: 1
NECK PAIN: 1
CONSTITUTIONAL NEGATIVE: 1
EYES NEGATIVE: 1
NEUROLOGICAL NEGATIVE: 1
GASTROINTESTINAL NEGATIVE: 1
RESPIRATORY NEGATIVE: 1
CHILLS: 0
FEVER: 0
PSYCHIATRIC NEGATIVE: 1

## 2022-07-31 ASSESSMENT — LIFESTYLE VARIABLES
TOTAL SCORE: 0
CONSUMPTION TOTAL: INCOMPLETE
EVER FELT BAD OR GUILTY ABOUT YOUR DRINKING: NO
TOTAL SCORE: 0
AVERAGE NUMBER OF DAYS PER WEEK YOU HAVE A DRINK CONTAINING ALCOHOL: 5
DOES PATIENT WANT TO STOP DRINKING: NO
ALCOHOL_USE: YES
DO YOU DRINK ALCOHOL: NO
HAVE YOU EVER FELT YOU SHOULD CUT DOWN ON YOUR DRINKING: NO
HAVE PEOPLE ANNOYED YOU BY CRITICIZING YOUR DRINKING: NO
EVER HAD A DRINK FIRST THING IN THE MORNING TO STEADY YOUR NERVES TO GET RID OF A HANGOVER: NO
TOTAL SCORE: 0

## 2022-07-31 ASSESSMENT — PAIN DESCRIPTION - PAIN TYPE
TYPE: SURGICAL PAIN
TYPE: ACUTE PAIN

## 2022-07-31 ASSESSMENT — FIBROSIS 4 INDEX: FIB4 SCORE: 1.32

## 2022-07-31 NOTE — ASSESSMENT & PLAN NOTE
CT scan showed abscess in the floor of the mouth on the left along of the body of the mandible  No sepsis and no leukocytosis  IND was done by oral surgeon on 7/31   IV Unasyn and IV fluid on admission and later switched to Augmentin  Control his blood sugar, continue Lantus  Continue Augmentin and follow-up with dentist and oral surgeon as outpatient

## 2022-07-31 NOTE — ED NOTES
Med rec completed per patient at bedside. Patient stated all meds besides Jardiance and Metformin his doctor took him off.    Allergies reviewed.    Patient stated he took penicillin within the last 30 days.    Home pharmacy is [facility] in [place].

## 2022-07-31 NOTE — ED TRIAGE NOTES
"Chief Complaint   Patient presents with   • Dental Pain     Patient reports L side dental/jaw pain x3days. Patient reports pain 7/10 at this time   • Sent by MD     Patient transferred from [facility] for dental abscess. CT read states \"2.2cm abscess medial to left mandible\". Patient here for ENT consult.     35 yo male bib EMS as a transfer from [facility] for above. Patient currently rates pain 8/10. Patient given 1mg dilaudid at 0700 for pain.    Patient reports he first noticed the pain x3days ago and was unable to get into dentist.    BP (!) 140/82   Pulse 81   Temp 37.3 °C (99.2 °F) (Temporal)   Resp 18   Ht 1.727 m (5' 8\")   Wt 60.4 kg (133 lb 2.5 oz)   SpO2 97%   BMI 20.25 kg/m²     "

## 2022-07-31 NOTE — H&P
"Hospital Medicine History & Physical Note    Date of Service  7/31/2022    Primary Care Physician  RADHA Garcia.    Consultants  Oral surgeon    Code Status  Full Code    Chief Complaint  Chief Complaint   Patient presents with   • Dental Pain     Patient reports L side dental/jaw pain x3days. Patient reports pain 7/10 at this time   • Sent by MD     Patient transferred from [facility] for dental abscess. CT read states \"2.2cm abscess medial to left mandible\". Patient here for ENT consult.       History of Presenting Illness    36-year-old male with history of uncontrolled diabetes hypertension and dyslipidemia presented 7/31 with neck pain and swelling.  Initially patient was seen at outside facility and transferred to our facility for higher level of care.  His symptoms started with tooth pain and later started having swelling on his jaw with severe pain, denied any fever or chills no chest pain or shortness of breath or other symptoms however patient was not able to eat.  On admission labs did not show leukocytosis..  His blood sugar was elevated 374 with A1c 12.9.  CT scan showed an abscess in the floor of the mouth on the left along of the body of the mandible.  Oral surgeon was consulted by ER and patient will be taken to the OR for I&D today.  IV fluid with IV Unasyn were started.    I discussed the plan of care with patient and bedside RN.    Review of Systems  Review of Systems   Constitutional: Negative.    HENT: Negative.    Eyes: Negative.    Respiratory: Negative.    Cardiovascular: Negative.    Gastrointestinal: Negative.    Genitourinary: Negative.    Musculoskeletal: Positive for neck pain.   Skin: Negative.    Neurological: Negative.    Psychiatric/Behavioral: Negative.        Past Medical History   has a past medical history of Diabetes (HCC), Hyperlipidemia, Hypertension, Pancreatitis, and Sinus congestion.    Surgical History  Dental surgery     Family History  family history " includes Diabetes in his father and mother.   Family history reviewed with patient. There is no family history that is pertinent to the chief complaint.     Social History   reports that he has been smoking cigarettes. He has smoked for the past 20.00 years. He has never used smokeless tobacco. He reports current alcohol use. He reports current drug use.    Allergies  Allergies   Allergen Reactions   • Hydrocodone-Acetaminophen      itchy       Medications  Prior to Admission Medications   Prescriptions Last Dose Informant Patient Reported? Taking?   JARDIANCE 10 MG Tab   No No   Sig: TAKE 2 TABLETS BY MOUTH EVERY DAY   TRUE METRIX BLOOD GLUCOSE TEST strip   No No   Sig: Use to check blood sugar before meals and/or at bedtime, up to 2 times per day   amLODIPine (NORVASC) 10 MG Tab   No No   Sig: Take 0.5 Tablets by mouth every day.   Patient not taking: No sig reported   atorvastatin (LIPITOR) 10 MG Tab   No No   Sig: Take 1 tablet by mouth every day.   Patient not taking: No sig reported   carvedilol (COREG) 25 MG Tab   No No   Sig: Take 1 tablet by mouth 2 times a day, with meals.   Patient not taking: No sig reported   clotrimazole (LOTRIMIN) 1 % Cream   No No   Sig: Apply 1 Application topically 2 times a day.   fenofibrate (TRICOR) 145 MG Tab   No No   Sig: Take 1 tablet by mouth every day.   Patient not taking: No sig reported   lisinopril (PRINIVIL) 5 MG Tab   No No   Sig: Take 1 tablet by mouth every day.   Patient not taking: No sig reported   metformin (GLUCOPHAGE) 1000 MG tablet   No No   Sig: Take 1 tablet by mouth 2 times a day, with meals.      Facility-Administered Medications: None       Physical Exam  Temp:  [37 °C (98.6 °F)-37.3 °C (99.2 °F)] 37.3 °C (99.2 °F)  Pulse:  [78-98] 81  Resp:  [18] 18  BP: (120-169)/(67-88) 140/82  SpO2:  [90 %-97 %] 97 %  Blood Pressure: (!) 140/82   Temperature: 37.3 °C (99.2 °F)   Pulse: 81   Respiration: 18   Pulse Oximetry: 97 %       Physical Exam  Constitutional:        General: He is in acute distress.      Appearance: He is ill-appearing.   HENT:      Head:      Comments: Swelling on his left face on his jaw  Eyes:      General: No scleral icterus.  Cardiovascular:      Rate and Rhythm: Normal rate.      Heart sounds: No murmur heard.  Pulmonary:      Effort: No respiratory distress.      Breath sounds: No wheezing.   Abdominal:      General: There is no distension.      Palpations: Abdomen is soft.      Tenderness: There is no abdominal tenderness. There is no guarding.   Musculoskeletal:         General: No deformity.      Right lower leg: No edema.      Left lower leg: No edema.   Skin:     Coloration: Skin is not jaundiced.      Findings: No bruising or lesion.   Neurological:      Mental Status: He is alert and oriented to person, place, and time. Mental status is at baseline.      Cranial Nerves: No cranial nerve deficit.      Motor: No weakness.         Laboratory:  Recent Labs     07/31/22  0401 07/31/22  0930   WBC 9.1 9.3   RBC 4.29* 4.18*   HEMOGLOBIN 14.6 14.3   HEMATOCRIT 40.7* 39.9*   MCV 94.9* 95.5   MCH 34.0* 34.2*   MCHC 35.9 35.8*   RDW 12.2 43.8   PLATELETCT 135 121*   MPV 10.1 10.5     Recent Labs     07/31/22  0401 07/31/22  0930   SODIUM 132* 139   POTASSIUM 3.7 3.3*   CHLORIDE 94* 101   CO2 17* 14*   GLUCOSE 374* 173*   BUN 7* 7*   CREATININE 0.5* 0.39*   CALCIUM 8.8 7.8*     Recent Labs     07/31/22  0401 07/31/22  0930   ALTSGPT 59 40   ASTSGOT 38 29   ALKPHOSPHAT 115 86   TBILIRUBIN 1.7* 1.3   GLUCOSE 374* 173*         No results for input(s): NTPROBNP in the last 72 hours.      No results for input(s): TROPONINT in the last 72 hours.    Imaging:  No orders to display       X-Ray:  I have personally reviewed the images and compared with prior images.  EKG:  I have personally reviewed the images and compared with prior images.    Assessment/Plan:  Justification for Admission Status  I anticipate this patient is appropriate for observation status at  this time because abscess    Patient will need a Med/Surg bed on MEDICAL service .  The need is secondary to abscess.    * Abscess- (present on admission)  Assessment & Plan  CT scan showed abscess in the floor of the mouth on the left along of the body of the mandible  Started with tooth pain  No sepsis and no leukocytosis  IV Unasyn and IV fluid  Oral surgeon was consulted and patient will be taken to the OR today  Control his blood sugar  Blood pressure is pending  No signs of respiratory distress at this time, close monitoring for any signs of respiratory distress    Metabolic acidosis  Assessment & Plan  Likely related to dehydration and infection  IV fluid and labs daily    Type 2 diabetes mellitus without complication, without long-term current use of insulin (HCC)- (present on admission)  Assessment & Plan  Uncontrolled and A1c 12.9  Patient is taking metformin and Jardiance  Sliding scale and start with Lantus if needed  Holding home medications   encouraged the patient to control his blood sugar at home and discussed the risk of uncontrolled diabetes including not limited to kidney failure heart failure and death.      Hypertriglyceridemia- (present on admission)  Assessment & Plan   continue home medication fenofibrate  Follow-up with PCP    Hypertension- (present on admission)  Assessment & Plan  Continue amlodipine and lisinopril, home medication.      VTE prophylaxis: SCDs/TEDs and enoxaparin ppx

## 2022-07-31 NOTE — ASSESSMENT & PLAN NOTE
Uncontrolled and A1c 12.9  Patient delivered DKA and needed ICU  DKA was resolved  Continue Lantus 20 units daily  Diabetes education to assist. Consider NPH BID if insurance is a limitation. Instead of sliding scale or carb counting with meals consider 5 units of fast-acting TID

## 2022-07-31 NOTE — PROGRESS NOTES
Report received from ER RN. Updated on POC.  Assumed care of patient upon arrival to unit. Patient currently A & O x 4, highly fatigued; on room air; up independently; with complaints of 8/10 facial/ jaw pain. Patient oriented to unit and to call light system. Call light within reach. Pt educated to fall risk. Fall precautions in place. Family at bedside. Bed locked and in lowest position. All questions answered. No other needs indicated at this time.

## 2022-07-31 NOTE — CARE PLAN
The patient is Stable - Low risk of patient condition declining or worsening    Shift Goals  Clinical Goals: pain management; surgery  Patient Goals: pain control; surgery  Family Goals: support; education; update to POC    Progress made toward(s) clinical / shift goals:    Problem: Pain - Standard  Goal: Alleviation of pain or a reduction in pain to the patient’s comfort goal  Outcome: Progressing     Problem: Knowledge Deficit - Standard  Goal: Patient and family/care givers will demonstrate understanding of plan of care, disease process/condition, diagnostic tests and medications  Outcome: Progressing   Pt oriented to unit and call light system. Discussed daily POC. Discussed pending surgery. Discussed daily medications and pain management options. Encouraged safe mobilization    Patient is not progressing towards the following goals:

## 2022-07-31 NOTE — PROGRESS NOTES
4 Eyes Skin Assessment Completed by MAYURI Bills and MAYURI Izquierdo.    Head Swelling  Ears WDL  Nose WDL  Mouth WDL  Neck swelling  Breast/Chest WDL  Shoulder Blades WDL  Spine WDL  (R) Arm/Elbow/Hand WDL  (L) Arm/Elbow/Hand WDL  Abdomen WDL  Groin WDL  Scrotum/Coccyx/Buttocks WDL  (R) Leg WDL  (L) Leg WDL  (R) Heel/Foot/Toe WDL  (L) Heel/Foot/Toe WDL          Devices In Places       Interventions In Place N/A    Possible Skin Injury No    Pictures Uploaded Into Epic N/A  Wound Consult Placed N/A  RN Wound Prevention Protocol Ordered No

## 2022-07-31 NOTE — ED PROVIDER NOTES
"ED Provider Note    Scribed for Negar Whitten M.D. by Mary Li. 7/31/2022, 9:24 AM.    Primary care provider: KELBY Garcia  Means of arrival: EMS  History obtained from: patient  History limited by: none    CHIEF COMPLAINT  Chief Complaint   Patient presents with   • Dental Pain     Patient reports L side dental/jaw pain x3days. Patient reports pain 7/10 at this time   • Sent by MD     Patient transferred from [facility] for dental abscess. CT read states \"2.2cm abscess medial to left mandible\". Patient here for ENT consult.       HPI  Jordin De Leon Jr. is a 36 y.o. male who has a history of diabetes, hypertension, and hyperlipidemia presents to the Emergency Department for evaluation of a neck abscess. For the past four days, Jordin had a moderate amount of left sided neck, neck swelling, and dental pain. He has pain and difficulty swallowing. He states that he has not been able to eat or drink much for the past three days secondary to his pain with swallowing. His last drink of water was at 4 AM this morning. He was evaluated at [facility] [place] earlier today and was diagnosed with a neck abscess.  He was treated with Unasyn and he was transferred her for possible surgery. Right now his pain is about a 7/10. He denies any fever or chills. No alleviating or exacerbating factors were reported. He takes metformin, jardiance, carvedilol, and lisinopril daily.     Reviewed old medical records show that Sindi went to [facility] [place] today for neck pain and swelling. He was found to have neck abscess and sent here for a higher level of care. He was treated with Unasyn for the abscess. CT Neck shows a read of \"Approximately 2.2 cm abscess medial to the left mandible, involving the left pterygoid muscle. There is also enlarged left palatine tonsil with adjacent pharyngeal mucosal edema and inflammation involving the left submandibular salivary gland.  This is most " "likely due to infection\". He test negative for Covid. Blood work shows a Hemolgobin A1c was 12.9, Blood sugar was 374, and a white count 9.1    REVIEW OF SYSTEMS  Review of Systems   Constitutional: Negative for chills and fever.   HENT:        Positive for neck pain, neck swelling, dental pain, and difficulty swallowing   Eyes: Negative for blurred vision.   Respiratory: Negative for shortness of breath.    Cardiovascular: Negative for chest pain.   Gastrointestinal: Negative for abdominal pain, nausea and vomiting.   Genitourinary: Negative for dysuria.   Neurological: Negative for dizziness and headaches.   All other systems reviewed and are negative.    PAST MEDICAL HISTORY   has a past medical history of Diabetes (HCC), Hyperlipidemia, Hypertension, Pancreatitis, and Sinus congestion.    SURGICAL HISTORY  patient denies any surgical history    SOCIAL HISTORY  Social History     Tobacco Use   • Smoking status: Current Every Day Smoker     Years: 20.00     Types: Cigarettes   • Smokeless tobacco: Never Used   • Tobacco comment: 3-4 per day   Vaping Use   • Vaping Use: Never used   Substance Use Topics   • Alcohol use: Yes     Comment: couple of beers after work   • Drug use: Yes     Comment: andrew      Social History     Substance and Sexual Activity   Drug Use Yes    Comment: andrew       FAMILY HISTORY  Family History   Problem Relation Age of Onset   • Diabetes Mother    • Diabetes Father        CURRENT MEDICATIONS  Home Medications     Reviewed by Raciel Avendano (Pharmacy Intern) on 07/31/22 at 1107  Med List Status: Complete   Medication Last Dose Status   acetaminophen (TYLENOL) 500 MG Tab 7/29/2022 Active   amLODIPine (NORVASC) 10 MG Tab  Active   atorvastatin (LIPITOR) 10 MG Tab  Active   carvedilol (COREG) 25 MG Tab  Active   clotrimazole (LOTRIMIN) 1 % Cream Not Taking Active   fenofibrate (TRICOR) 145 MG Tab  Active   JARDIANCE 10 MG Tab 7/29/2022 Active   lisinopril (PRINIVIL) 5 MG Tab  Active " "  metformin (GLUCOPHAGE) 1000 MG tablet 7/29/2022 Active   TRUE METRIX BLOOD GLUCOSE TEST strip Unknown Active                ALLERGIES  Allergies   Allergen Reactions   • Hydrocodone-Acetaminophen      itchy       PHYSICAL EXAM  VITAL SIGNS: BP (!) 140/82   Pulse 81   Temp 37.3 °C (99.2 °F) (Temporal)   Resp 18   Ht 1.727 m (5' 8\")   Wt 60.4 kg (133 lb 2.5 oz)   SpO2 97%   BMI 20.25 kg/m²   Vitals reviewed by myself.  Physical Exam   Nursing note and vitals reviewed.  Constitutional: Well-developed and well-nourished.  Patient appears uncomfortable  HENT: Head is normocephalic and atraumatic.  Patient has trismus cannot adequately evaluate oropharynx  Eyes: extra-ocular movements intact  Neck: Swelling noted to the left neck with associated tenderness  Cardiovascular: Regular rate and regular rhythm. No murmur heard.  Pulmonary/Chest: Breath sounds normal. No wheezes or rales.   Musculoskeletal: Extremities exhibit normal range of motion without edema or tenderness.   Neurological: Awake and alert  Skin: Skin is warm and dry. No rash.     DIAGNOSTIC STUDIES /  LABS  Labs Reviewed   CBC WITH DIFFERENTIAL - Abnormal; Notable for the following components:       Result Value    RBC 4.18 (*)     Hematocrit 39.9 (*)     MCH 34.2 (*)     MCHC 35.8 (*)     Platelet Count 121 (*)     Lymphocytes 15.50 (*)     Monos (Absolute) 1.18 (*)     All other components within normal limits   COMP METABOLIC PANEL - Abnormal; Notable for the following components:    Potassium 3.3 (*)     Co2 14 (*)     Anion Gap 24.0 (*)     Glucose 173 (*)     Bun 7 (*)     Creatinine 0.39 (*)     Calcium 7.8 (*)     All other components within normal limits   CBC WITH DIFFERENTIAL - Abnormal; Notable for the following components:    WBC 12.0 (*)     MCV 99.4 (*)     MCH 33.8 (*)     Platelet Count 156 (*)     Neutrophils-Polys 83.90 (*)     Lymphocytes 4.60 (*)     Neutrophils (Absolute) 10.02 (*)     Lymphs (Absolute) 0.55 (*)     Monos " "(Absolute) 1.14 (*)     All other components within normal limits   COMP METABOLIC PANEL - Abnormal; Notable for the following components:    Sodium 134 (*)     Potassium 3.5 (*)     Co2 7 (*)     Anion Gap 30.0 (*)     Glucose 328 (*)     Calcium 7.8 (*)     AST(SGOT) 49 (*)     Alkaline Phosphatase 112 (*)     All other components within normal limits   CRP QUANTITIVE (NON-CARDIAC) - Abnormal; Notable for the following components:    Stat C-Reactive Protein 37.40 (*)     All other components within normal limits   BETA-HYDROXYBUTYRIC ACID - Abnormal; Notable for the following components:    beta-Hydroxybutyric Acid >8.00 (*)     All other components within normal limits   POCT GLUCOSE DEVICE RESULTS - Abnormal; Notable for the following components:    POC Glucose, Blood 197 (*)     All other components within normal limits   ESTIMATED GFR   BLOOD CULTURE    Narrative:     Per Hospital Policy: Only change Specimen Src: to \"Line\" if  specified by physician order.  Left AC   BLOOD CULTURE    Narrative:     Per Hospital Policy: Only change Specimen Src: to \"Line\" if  specified by physician order.  No site indicated   MAGNESIUM   GRAM STAIN    Narrative:     Surgery - swabs received   ESTIMATED GFR   PHOSPHORUS   CULTURE WOUND W/ GRAM STAIN    Narrative:     Surgery - swabs received   ANAEROBIC CULTURE    Narrative:     Surgery - swabs received   LACTIC ACID   CBC WITH DIFFERENTIAL   COMP METABOLIC PANEL   MAGNESIUM   BASIC METABOLIC PANEL   OSMOLALITY SERUM     All labs reviewed by me.    REASSESSMENT  9:38 AM Patient was evaluated at bedside. Ordered CBC with diff and CMP. He will be treated with fentanyl 50 mcg for his pain. Fluids were given for NPO status. I informed the patient of my plan to run diagnostic studies. Patient verbalizes understanding and support with my plan of care.     9:44 AM I discussed the patient's case and the above findings with Dr. Sutton (ENT) who plans on consultation.     9:49 AM Dr." Lesley reviewed images and believes this best suited for oral surgery.     10:00 AM I discussed the patient's case and the above findings with Dr. Mackey (Hospitalist) who agrees to admit the patient.     10:01 AM I discussed the patient's case and the above findings with Dr. Mcmahon (Oral surgery) who plans to take the patient into the OR.     COURSE & MEDICAL DECISION MAKING  Nursing notes, VS, PMSFHx reviewed in chart.    Patient is a 36-year-old male who comes in for evaluation of neck pain and swelling.  On exam patient is well-appearing with slight hypertension.  He is managing his secretions without difficulty.  His airway is intact.  Outside records reviewed and notable for neck abscess.  Patient was empirically treated with Unasyn.  Will obtain baseline screening labs for likely surgical intervention.  Patient will be kept NPO.  I discussed the case with initially ENT Dr. Sutton  who evaluated the images and felt that this was more likely odontogenic in origin and therefore recommended oral surgery be consulted.  Discussed the case with Dr. Mcmahon who will plan to take patient to the OR for definitive management.  Patient will be hospitalized in the meantime for ongoing management.  He is in guarded condition.    DISPOSITION:  Patient will be hospitalized by Dr. Mackey in guarded condition.    FINAL IMPRESSION  1. Abscess          Mary SEXTON (Victorina), am scribing for, and in the presence of, Negar Whitten M.D..    Electronically signed by: Mary Li (Victorina), 7/31/2022    Negar SEXTON M.D. personally performed the services described in this documentation, as scribed by Mary Li in my presence, and it is both accurate and complete.    The note accurately reflects work and decisions made by me.  Negar Whitten M.D.  8/1/2022  8:12 AM

## 2022-07-31 NOTE — ASSESSMENT & PLAN NOTE
Likely related to dehydration and infection  IV fluid and labs daily  Close monitoring and control blood sugar

## 2022-08-01 ENCOUNTER — APPOINTMENT (OUTPATIENT)
Dept: RADIOLOGY | Facility: MEDICAL CENTER | Age: 37
DRG: 144 | End: 2022-08-01
Attending: INTERNAL MEDICINE
Payer: COMMERCIAL

## 2022-08-01 PROBLEM — E87.29 HIGH ANION GAP METABOLIC ACIDOSIS: Status: ACTIVE | Noted: 2022-07-31

## 2022-08-01 PROBLEM — E87.6 HYPOKALEMIA: Status: ACTIVE | Noted: 2022-08-01

## 2022-08-01 LAB
ALBUMIN SERPL BCP-MCNC: 3.4 G/DL (ref 3.2–4.9)
ALBUMIN SERPL BCP-MCNC: 4 G/DL (ref 3.2–4.9)
ALBUMIN/GLOB SERPL: 1.2 G/DL
ALBUMIN/GLOB SERPL: 1.3 G/DL
ALP SERPL-CCNC: 112 U/L (ref 30–99)
ALP SERPL-CCNC: 151 U/L (ref 30–99)
ALT SERPL-CCNC: 48 U/L (ref 2–50)
ALT SERPL-CCNC: 59 U/L (ref 2–50)
ANION GAP SERPL CALC-SCNC: 10 MMOL/L (ref 7–16)
ANION GAP SERPL CALC-SCNC: 14 MMOL/L (ref 7–16)
ANION GAP SERPL CALC-SCNC: 24 MMOL/L (ref 7–16)
ANION GAP SERPL CALC-SCNC: 30 MMOL/L (ref 7–16)
AST SERPL-CCNC: 109 U/L (ref 12–45)
AST SERPL-CCNC: 49 U/L (ref 12–45)
B-OH-BUTYR SERPL-MCNC: >8 MMOL/L (ref 0.02–0.27)
BASOPHILS # BLD AUTO: 0.3 % (ref 0–1.8)
BASOPHILS # BLD: 0.04 K/UL (ref 0–0.12)
BILIRUB SERPL-MCNC: 0.7 MG/DL (ref 0.1–1.5)
BILIRUB SERPL-MCNC: 0.9 MG/DL (ref 0.1–1.5)
BUN SERPL-MCNC: 11 MG/DL (ref 8–22)
BUN SERPL-MCNC: 12 MG/DL (ref 8–22)
BUN SERPL-MCNC: 12 MG/DL (ref 8–22)
BUN SERPL-MCNC: 13 MG/DL (ref 8–22)
CALCIUM SERPL-MCNC: 7.7 MG/DL (ref 8.5–10.5)
CALCIUM SERPL-MCNC: 7.8 MG/DL (ref 8.5–10.5)
CALCIUM SERPL-MCNC: 7.9 MG/DL (ref 8.5–10.5)
CALCIUM SERPL-MCNC: 8.2 MG/DL (ref 8.5–10.5)
CHLORIDE SERPL-SCNC: 101 MMOL/L (ref 96–112)
CHLORIDE SERPL-SCNC: 107 MMOL/L (ref 96–112)
CHLORIDE SERPL-SCNC: 107 MMOL/L (ref 96–112)
CHLORIDE SERPL-SCNC: 97 MMOL/L (ref 96–112)
CO2 SERPL-SCNC: 10 MMOL/L (ref 20–33)
CO2 SERPL-SCNC: 17 MMOL/L (ref 20–33)
CO2 SERPL-SCNC: 20 MMOL/L (ref 20–33)
CO2 SERPL-SCNC: 7 MMOL/L (ref 20–33)
CREAT SERPL-MCNC: 0.56 MG/DL (ref 0.5–1.4)
CREAT SERPL-MCNC: 0.63 MG/DL (ref 0.5–1.4)
CREAT SERPL-MCNC: 0.69 MG/DL (ref 0.5–1.4)
CREAT SERPL-MCNC: 0.75 MG/DL (ref 0.5–1.4)
CRP SERPL HS-MCNC: 37.4 MG/DL (ref 0–0.75)
EOSINOPHIL # BLD AUTO: 0.12 K/UL (ref 0–0.51)
EOSINOPHIL NFR BLD: 1 % (ref 0–6.9)
ERYTHROCYTE [DISTWIDTH] IN BLOOD BY AUTOMATED COUNT: 46.5 FL (ref 35.9–50)
GFR SERPLBLD CREATININE-BSD FMLA CKD-EPI: 119 ML/MIN/1.73 M 2
GFR SERPLBLD CREATININE-BSD FMLA CKD-EPI: 122 ML/MIN/1.73 M 2
GFR SERPLBLD CREATININE-BSD FMLA CKD-EPI: 126 ML/MIN/1.73 M 2
GFR SERPLBLD CREATININE-BSD FMLA CKD-EPI: 130 ML/MIN/1.73 M 2
GLOBULIN SER CALC-MCNC: 2.8 G/DL (ref 1.9–3.5)
GLOBULIN SER CALC-MCNC: 3 G/DL (ref 1.9–3.5)
GLUCOSE BLD STRIP.AUTO-MCNC: 132 MG/DL (ref 65–99)
GLUCOSE BLD STRIP.AUTO-MCNC: 147 MG/DL (ref 65–99)
GLUCOSE BLD STRIP.AUTO-MCNC: 155 MG/DL (ref 65–99)
GLUCOSE BLD STRIP.AUTO-MCNC: 165 MG/DL (ref 65–99)
GLUCOSE BLD STRIP.AUTO-MCNC: 172 MG/DL (ref 65–99)
GLUCOSE BLD STRIP.AUTO-MCNC: 174 MG/DL (ref 65–99)
GLUCOSE BLD STRIP.AUTO-MCNC: 177 MG/DL (ref 65–99)
GLUCOSE BLD STRIP.AUTO-MCNC: 184 MG/DL (ref 65–99)
GLUCOSE BLD STRIP.AUTO-MCNC: 192 MG/DL (ref 65–99)
GLUCOSE BLD STRIP.AUTO-MCNC: 214 MG/DL (ref 65–99)
GLUCOSE BLD STRIP.AUTO-MCNC: 216 MG/DL (ref 65–99)
GLUCOSE BLD STRIP.AUTO-MCNC: 227 MG/DL (ref 65–99)
GLUCOSE BLD STRIP.AUTO-MCNC: 230 MG/DL (ref 65–99)
GLUCOSE BLD STRIP.AUTO-MCNC: 261 MG/DL (ref 65–99)
GLUCOSE BLD STRIP.AUTO-MCNC: 278 MG/DL (ref 65–99)
GLUCOSE BLD STRIP.AUTO-MCNC: 312 MG/DL (ref 65–99)
GLUCOSE SERPL-MCNC: 179 MG/DL (ref 65–99)
GLUCOSE SERPL-MCNC: 194 MG/DL (ref 65–99)
GLUCOSE SERPL-MCNC: 243 MG/DL (ref 65–99)
GLUCOSE SERPL-MCNC: 328 MG/DL (ref 65–99)
GRAM STN SPEC: NORMAL
HCT VFR BLD AUTO: 47.4 % (ref 42–52)
HGB BLD-MCNC: 16.1 G/DL (ref 14–18)
IMM GRANULOCYTES # BLD AUTO: 0.08 K/UL (ref 0–0.11)
IMM GRANULOCYTES NFR BLD AUTO: 0.7 % (ref 0–0.9)
LACTATE SERPL-SCNC: 1.6 MMOL/L (ref 0.5–2)
LYMPHOCYTES # BLD AUTO: 0.55 K/UL (ref 1–4.8)
LYMPHOCYTES NFR BLD: 4.6 % (ref 22–41)
MAGNESIUM SERPL-MCNC: 2 MG/DL (ref 1.5–2.5)
MAGNESIUM SERPL-MCNC: 2.1 MG/DL (ref 1.5–2.5)
MAGNESIUM SERPL-MCNC: 2.2 MG/DL (ref 1.5–2.5)
MCH RBC QN AUTO: 33.8 PG (ref 27–33)
MCHC RBC AUTO-ENTMCNC: 34 G/DL (ref 33.7–35.3)
MCV RBC AUTO: 99.4 FL (ref 81.4–97.8)
MONOCYTES # BLD AUTO: 1.14 K/UL (ref 0–0.85)
MONOCYTES NFR BLD AUTO: 9.5 % (ref 0–13.4)
NEUTROPHILS # BLD AUTO: 10.02 K/UL (ref 1.82–7.42)
NEUTROPHILS NFR BLD: 83.9 % (ref 44–72)
NRBC # BLD AUTO: 0 K/UL
NRBC BLD-RTO: 0 /100 WBC
PHOSPHATE SERPL-MCNC: 1 MG/DL (ref 2.5–4.5)
PHOSPHATE SERPL-MCNC: 2.1 MG/DL (ref 2.5–4.5)
PHOSPHATE SERPL-MCNC: 2.9 MG/DL (ref 2.5–4.5)
PLATELET # BLD AUTO: 156 K/UL (ref 164–446)
PMV BLD AUTO: 9.9 FL (ref 9–12.9)
POTASSIUM SERPL-SCNC: 3.4 MMOL/L (ref 3.6–5.5)
POTASSIUM SERPL-SCNC: 3.5 MMOL/L (ref 3.6–5.5)
POTASSIUM SERPL-SCNC: 3.7 MMOL/L (ref 3.6–5.5)
POTASSIUM SERPL-SCNC: 4.2 MMOL/L (ref 3.6–5.5)
PROT SERPL-MCNC: 6.2 G/DL (ref 6–8.2)
PROT SERPL-MCNC: 7 G/DL (ref 6–8.2)
RBC # BLD AUTO: 4.77 M/UL (ref 4.7–6.1)
SCCMEC + MECA PNL NOSE NAA+PROBE: NEGATIVE
SIGNIFICANT IND 70042: NORMAL
SITE SITE: NORMAL
SODIUM SERPL-SCNC: 134 MMOL/L (ref 135–145)
SODIUM SERPL-SCNC: 135 MMOL/L (ref 135–145)
SODIUM SERPL-SCNC: 137 MMOL/L (ref 135–145)
SODIUM SERPL-SCNC: 138 MMOL/L (ref 135–145)
SOURCE SOURCE: NORMAL
WBC # BLD AUTO: 12 K/UL (ref 4.8–10.8)

## 2022-08-01 PROCEDURE — 99291 CRITICAL CARE FIRST HOUR: CPT | Performed by: INTERNAL MEDICINE

## 2022-08-01 PROCEDURE — 80048 BASIC METABOLIC PNL TOTAL CA: CPT | Mod: 91

## 2022-08-01 PROCEDURE — 700101 HCHG RX REV CODE 250: Performed by: INTERNAL MEDICINE

## 2022-08-01 PROCEDURE — 86140 C-REACTIVE PROTEIN: CPT

## 2022-08-01 PROCEDURE — 82962 GLUCOSE BLOOD TEST: CPT | Mod: 91

## 2022-08-01 PROCEDURE — 80053 COMPREHEN METABOLIC PANEL: CPT

## 2022-08-01 PROCEDURE — 83735 ASSAY OF MAGNESIUM: CPT

## 2022-08-01 PROCEDURE — A9270 NON-COVERED ITEM OR SERVICE: HCPCS | Performed by: INTERNAL MEDICINE

## 2022-08-01 PROCEDURE — 700111 HCHG RX REV CODE 636 W/ 250 OVERRIDE (IP): Performed by: STUDENT IN AN ORGANIZED HEALTH CARE EDUCATION/TRAINING PROGRAM

## 2022-08-01 PROCEDURE — 770022 HCHG ROOM/CARE - ICU (200)

## 2022-08-01 PROCEDURE — 82010 KETONE BODYS QUAN: CPT

## 2022-08-01 PROCEDURE — 700105 HCHG RX REV CODE 258: Performed by: INTERNAL MEDICINE

## 2022-08-01 PROCEDURE — 84100 ASSAY OF PHOSPHORUS: CPT

## 2022-08-01 PROCEDURE — 700102 HCHG RX REV CODE 250 W/ 637 OVERRIDE(OP): Performed by: INTERNAL MEDICINE

## 2022-08-01 PROCEDURE — 87641 MR-STAPH DNA AMP PROBE: CPT

## 2022-08-01 PROCEDURE — 700111 HCHG RX REV CODE 636 W/ 250 OVERRIDE (IP): Performed by: INTERNAL MEDICINE

## 2022-08-01 PROCEDURE — 83605 ASSAY OF LACTIC ACID: CPT

## 2022-08-01 PROCEDURE — 700102 HCHG RX REV CODE 250 W/ 637 OVERRIDE(OP): Performed by: STUDENT IN AN ORGANIZED HEALTH CARE EDUCATION/TRAINING PROGRAM

## 2022-08-01 PROCEDURE — 85025 COMPLETE CBC W/AUTO DIFF WBC: CPT

## 2022-08-01 RX ORDER — INSULIN LISPRO 100 [IU]/ML
0.2 INJECTION, SOLUTION INTRAVENOUS; SUBCUTANEOUS
Status: DISCONTINUED | OUTPATIENT
Start: 2022-08-02 | End: 2022-08-04 | Stop reason: HOSPADM

## 2022-08-01 RX ORDER — SODIUM CHLORIDE, SODIUM LACTATE, POTASSIUM CHLORIDE, AND CALCIUM CHLORIDE .6; .31; .03; .02 G/100ML; G/100ML; G/100ML; G/100ML
2000 INJECTION, SOLUTION INTRAVENOUS ONCE
Status: COMPLETED | OUTPATIENT
Start: 2022-08-01 | End: 2022-08-01

## 2022-08-01 RX ORDER — MAGNESIUM SULFATE HEPTAHYDRATE 40 MG/ML
4 INJECTION, SOLUTION INTRAVENOUS
Status: DISCONTINUED | OUTPATIENT
Start: 2022-08-01 | End: 2022-08-01

## 2022-08-01 RX ORDER — MAGNESIUM SULFATE HEPTAHYDRATE 40 MG/ML
2 INJECTION, SOLUTION INTRAVENOUS
Status: DISCONTINUED | OUTPATIENT
Start: 2022-08-01 | End: 2022-08-01

## 2022-08-01 RX ORDER — ROCURONIUM BROMIDE 10 MG/ML
100 INJECTION, SOLUTION INTRAVENOUS ONCE
Status: DISCONTINUED | OUTPATIENT
Start: 2022-08-01 | End: 2022-08-01 | Stop reason: CLARIF

## 2022-08-01 RX ORDER — DEXTROSE MONOHYDRATE 25 G/50ML
25 INJECTION, SOLUTION INTRAVENOUS
Status: DISCONTINUED | OUTPATIENT
Start: 2022-08-01 | End: 2022-08-03

## 2022-08-01 RX ORDER — DEXTROSE, SODIUM CHLORIDE, SODIUM LACTATE, POTASSIUM CHLORIDE, AND CALCIUM CHLORIDE 5; .6; .31; .03; .02 G/100ML; G/100ML; G/100ML; G/100ML; G/100ML
INJECTION, SOLUTION INTRAVENOUS CONTINUOUS
Status: DISCONTINUED | OUTPATIENT
Start: 2022-08-01 | End: 2022-08-01

## 2022-08-01 RX ORDER — SODIUM CHLORIDE, SODIUM LACTATE, POTASSIUM CHLORIDE, CALCIUM CHLORIDE 600; 310; 30; 20 MG/100ML; MG/100ML; MG/100ML; MG/100ML
INJECTION, SOLUTION INTRAVENOUS CONTINUOUS
Status: DISCONTINUED | OUTPATIENT
Start: 2022-08-01 | End: 2022-08-01

## 2022-08-01 RX ORDER — POTASSIUM CHLORIDE 7.45 MG/ML
10 INJECTION INTRAVENOUS
Status: COMPLETED | OUTPATIENT
Start: 2022-08-01 | End: 2022-08-01

## 2022-08-01 RX ORDER — AMOXICILLIN AND CLAVULANATE POTASSIUM 875; 125 MG/1; MG/1
1 TABLET, FILM COATED ORAL EVERY 12 HOURS
Status: DISCONTINUED | OUTPATIENT
Start: 2022-08-01 | End: 2022-08-04 | Stop reason: HOSPADM

## 2022-08-01 RX ORDER — DEXTROSE AND SODIUM CHLORIDE 10; .45 G/100ML; G/100ML
INJECTION, SOLUTION INTRAVENOUS CONTINUOUS
Status: ACTIVE | OUTPATIENT
Start: 2022-08-01 | End: 2022-08-02

## 2022-08-01 RX ORDER — INSULIN LISPRO 100 [IU]/ML
2-9 INJECTION, SOLUTION INTRAVENOUS; SUBCUTANEOUS
Status: DISCONTINUED | OUTPATIENT
Start: 2022-08-02 | End: 2022-08-04 | Stop reason: HOSPADM

## 2022-08-01 RX ORDER — ENOXAPARIN SODIUM 100 MG/ML
30 INJECTION SUBCUTANEOUS DAILY
Status: DISCONTINUED | OUTPATIENT
Start: 2022-08-01 | End: 2022-08-03

## 2022-08-01 RX ORDER — ETOMIDATE 2 MG/ML
20 INJECTION INTRAVENOUS ONCE
Status: DISCONTINUED | OUTPATIENT
Start: 2022-08-01 | End: 2022-08-01 | Stop reason: CLARIF

## 2022-08-01 RX ORDER — SODIUM CHLORIDE 9 MG/ML
2000 INJECTION, SOLUTION INTRAVENOUS ONCE
Status: DISCONTINUED | OUTPATIENT
Start: 2022-08-01 | End: 2022-08-01

## 2022-08-01 RX ADMIN — POTASSIUM BICARBONATE 50 MEQ: 978 TABLET, EFFERVESCENT ORAL at 14:56

## 2022-08-01 RX ADMIN — OXYCODONE 5 MG: 5 TABLET ORAL at 15:25

## 2022-08-01 RX ADMIN — ENOXAPARIN SODIUM 30 MG: 30 INJECTION SUBCUTANEOUS at 17:52

## 2022-08-01 RX ADMIN — SODIUM CHLORIDE 30 MMOL: 450 INJECTION, SOLUTION INTRAVENOUS at 21:49

## 2022-08-01 RX ADMIN — INSULIN GLARGINE-YFGN 11 UNITS: 100 INJECTION, SOLUTION SUBCUTANEOUS at 23:03

## 2022-08-01 RX ADMIN — OXYCODONE 5 MG: 5 TABLET ORAL at 06:27

## 2022-08-01 RX ADMIN — SODIUM CHLORIDE 3 UNITS/HR: 9 INJECTION, SOLUTION INTRAVENOUS at 07:38

## 2022-08-01 RX ADMIN — POTASSIUM CHLORIDE 10 MEQ: 7.46 INJECTION, SOLUTION INTRAVENOUS at 13:39

## 2022-08-01 RX ADMIN — POTASSIUM CHLORIDE 10 MEQ: 7.46 INJECTION, SOLUTION INTRAVENOUS at 10:37

## 2022-08-01 RX ADMIN — LISINOPRIL 5 MG: 5 TABLET ORAL at 06:27

## 2022-08-01 RX ADMIN — POTASSIUM CHLORIDE 10 MEQ: 7.46 INJECTION, SOLUTION INTRAVENOUS at 21:49

## 2022-08-01 RX ADMIN — SODIUM CHLORIDE, POTASSIUM CHLORIDE, SODIUM LACTATE AND CALCIUM CHLORIDE 2000 ML: 600; 310; 30; 20 INJECTION, SOLUTION INTRAVENOUS at 06:10

## 2022-08-01 RX ADMIN — SODIUM CHLORIDE 3 G: 900 INJECTION INTRAVENOUS at 07:38

## 2022-08-01 RX ADMIN — AMOXICILLIN AND CLAVULANATE POTASSIUM 1 TABLET: 875; 125 TABLET, FILM COATED ORAL at 11:35

## 2022-08-01 RX ADMIN — SODIUM CHLORIDE, SODIUM LACTATE, POTASSIUM CHLORIDE, CALCIUM CHLORIDE AND DEXTROSE MONOHYDRATE: 5; 600; 310; 30; 20 INJECTION, SOLUTION INTRAVENOUS at 18:12

## 2022-08-01 RX ADMIN — DEXTROSE AND SODIUM CHLORIDE: 10; .45 INJECTION, SOLUTION INTRAVENOUS at 22:26

## 2022-08-01 RX ADMIN — POTASSIUM CHLORIDE 10 MEQ: 7.46 INJECTION, SOLUTION INTRAVENOUS at 11:35

## 2022-08-01 RX ADMIN — AMOXICILLIN AND CLAVULANATE POTASSIUM 1 TABLET: 875; 125 TABLET, FILM COATED ORAL at 17:52

## 2022-08-01 RX ADMIN — POTASSIUM CHLORIDE 10 MEQ: 7.46 INJECTION, SOLUTION INTRAVENOUS at 20:43

## 2022-08-01 RX ADMIN — OXYCODONE 5 MG: 5 TABLET ORAL at 22:26

## 2022-08-01 RX ADMIN — SODIUM CHLORIDE, SODIUM LACTATE, POTASSIUM CHLORIDE, CALCIUM CHLORIDE AND DEXTROSE MONOHYDRATE: 5; 600; 310; 30; 20 INJECTION, SOLUTION INTRAVENOUS at 09:40

## 2022-08-01 RX ADMIN — MORPHINE SULFATE 2 MG: 4 INJECTION INTRAVENOUS at 04:47

## 2022-08-01 RX ADMIN — POTASSIUM CHLORIDE 10 MEQ: 7.46 INJECTION, SOLUTION INTRAVENOUS at 12:37

## 2022-08-01 RX ADMIN — POTASSIUM CHLORIDE 10 MEQ: 7.46 INJECTION, SOLUTION INTRAVENOUS at 06:22

## 2022-08-01 RX ADMIN — ACETAMINOPHEN 650 MG: 325 TABLET, FILM COATED ORAL at 06:27

## 2022-08-01 RX ADMIN — POTASSIUM CHLORIDE 10 MEQ: 7.46 INJECTION, SOLUTION INTRAVENOUS at 07:55

## 2022-08-01 RX ADMIN — SODIUM CHLORIDE, POTASSIUM CHLORIDE, SODIUM LACTATE AND CALCIUM CHLORIDE: 600; 310; 30; 20 INJECTION, SOLUTION INTRAVENOUS at 07:12

## 2022-08-01 ASSESSMENT — COGNITIVE AND FUNCTIONAL STATUS - GENERAL
MOBILITY SCORE: 24
SUGGESTED CMS G CODE MODIFIER MOBILITY: CH
SUGGESTED CMS G CODE MODIFIER DAILY ACTIVITY: CH
DAILY ACTIVITIY SCORE: 24

## 2022-08-01 ASSESSMENT — ENCOUNTER SYMPTOMS
FOCAL WEAKNESS: 0
STRIDOR: 0
FEVER: 0
CHILLS: 0
SHORTNESS OF BREATH: 0
MYALGIAS: 0
NAUSEA: 0
BLURRED VISION: 0
SENSORY CHANGE: 0
HEADACHES: 0
COUGH: 0
SPUTUM PRODUCTION: 0
ABDOMINAL PAIN: 0
VOMITING: 0
DIZZINESS: 0

## 2022-08-01 ASSESSMENT — FIBROSIS 4 INDEX: FIB4 SCORE: 1.63

## 2022-08-01 ASSESSMENT — LIFESTYLE VARIABLES
TOTAL SCORE: 0
TOTAL SCORE: 0
HAVE YOU EVER FELT YOU SHOULD CUT DOWN ON YOUR DRINKING: NO
TOTAL SCORE: 0
AVERAGE NUMBER OF DAYS PER WEEK YOU HAVE A DRINK CONTAINING ALCOHOL: 5
HOW MANY TIMES IN THE PAST YEAR HAVE YOU HAD 5 OR MORE DRINKS IN A DAY: 0
ALCOHOL_USE: YES
EVER HAD A DRINK FIRST THING IN THE MORNING TO STEADY YOUR NERVES TO GET RID OF A HANGOVER: NO
DOES PATIENT WANT TO STOP DRINKING: NO
ON A TYPICAL DAY WHEN YOU DRINK ALCOHOL HOW MANY DRINKS DO YOU HAVE: 3
HAVE PEOPLE ANNOYED YOU BY CRITICIZING YOUR DRINKING: NO
EVER FELT BAD OR GUILTY ABOUT YOUR DRINKING: NO
CONSUMPTION TOTAL: POSITIVE

## 2022-08-01 ASSESSMENT — PAIN DESCRIPTION - PAIN TYPE
TYPE: ACUTE PAIN
TYPE: SURGICAL PAIN
TYPE: ACUTE PAIN
TYPE: ACUTE PAIN

## 2022-08-01 NOTE — PROGRESS NOTES
Received patient from PACU [facility]. Resp even and unlabored. Oral packing with dry gauze intact.

## 2022-08-01 NOTE — PROGRESS NOTES
1  Patient being transferred to ICU. Bedside report given to Jessica MESSINA      2  Call placed to patient's alternate contact no answer.   Patient consented for call placed to his home number Spoke with patient's dad Jordin De Leon SR. Informed that patient being transferred to ICU

## 2022-08-01 NOTE — CONSULTS
Critical Care Consultation    Date of consult: 8/1/2022    Referring Physician  Berto Mackey M.D.    Reason for Consultation  Anion gap metabolic acidosis    History of Presenting Illness  36 y.o. male with past medical history of diabetes, hypertension, hyperlipidemia who presented 7/31/2022 as a transfer from [facility] where he presented with dentalgia and was found to have a 2.2 cm abscess medial to the left mandible on CT.  Admission labs show a mild acidosis with hyperglycemia and an anion gap of 21.  He was admitted to the hospitalist service on sliding scale insulin and underwent an I&D of his abscess with OMFS.  This morning his labs returned with again hyperglycemia at 328, anion gap 30 and CO2 7.  No beta hydroxybutyric acid has been obtained however is presumed to be the likely culprit of his anion gap metabolic acidosis.  His hemoglobin A1c on admission was noted at 12.9.  His diabetes is managed at home with Jardiance and metformin.    Code Status  Full Code    Review of Systems  Review of Systems   Constitutional: Negative for chills and fever.   HENT:        Oral pain and bleeding from I&D site   Eyes: Negative for blurred vision.   Respiratory: Negative for cough, sputum production, shortness of breath and stridor.    Cardiovascular: Negative for chest pain.   Gastrointestinal: Negative for abdominal pain, nausea and vomiting.   Genitourinary: Negative for dysuria.   Musculoskeletal: Negative for myalgias.   Skin: Negative for rash.   Neurological: Negative for dizziness, sensory change and focal weakness.       Past Medical History   has a past medical history of Diabetes (HCC), Hyperlipidemia, Hypertension, Pancreatitis, and Sinus congestion.    He has no past medical history of Addisons disease (HCC), Adrenal disorder (HCC), Allergy, Anemia, Anxiety, Arrhythmia, Arthritis, Asthma, Blood transfusion without reported diagnosis, BRCA1 negative, BRCA1 positive, BRCA2 gene mutation  positive, BRCA2 negative, Breast cancer (HCC), Cancer (HCC), Cancer of peritoneum (HCC), Cataract, CHF (congestive heart failure) (HCC), Clotting disorder (HCC), COPD (chronic obstructive pulmonary disease) (HCC), Cushings syndrome (HCC), Depression, Diabetic neuropathy (HCC), GERD (gastroesophageal reflux disease), Glaucoma, Goiter, Head ache, Heart attack (HCC), Heart murmur, HIV (human immunodeficiency virus infection) (HCC), IBD (inflammatory bowel disease), Kidney disease, Malignant neoplasm of fallopian tube (HCC), Meningitis, Migraine, Muscle disorder, Osteoporosis, Ovarian cancer (HCC), Parathyroid disorder (HCC), Pituitary disease (HCC), Pulmonary emphysema (HCC), Seizure (HCC), Sickle cell disease (HCC), Stroke (HCC), Substance abuse (HCC), Thyroid disease, Tuberculosis, or Urinary tract infection.    Surgical History   has no past surgical history on file.    Family History  family history includes Diabetes in his father and mother.    Social History   reports that he has been smoking cigarettes. He has smoked for the past 20.00 years. He has never used smokeless tobacco. He reports current alcohol use. He reports current drug use.    Medications  Home Medications     Reviewed by Raciel Avendano (Pharmacy Intern) on 07/31/22 at 1107  Med List Status: Complete   Medication Last Dose Status   acetaminophen (TYLENOL) 500 MG Tab 7/29/2022 Active   amLODIPine (NORVASC) 10 MG Tab  Active   atorvastatin (LIPITOR) 10 MG Tab  Active   carvedilol (COREG) 25 MG Tab  Active   clotrimazole (LOTRIMIN) 1 % Cream Not Taking Active   fenofibrate (TRICOR) 145 MG Tab  Active   JARDIANCE 10 MG Tab 7/29/2022 Active   lisinopril (PRINIVIL) 5 MG Tab  Active   metformin (GLUCOPHAGE) 1000 MG tablet 7/29/2022 Active   TRUE METRIX BLOOD GLUCOSE TEST strip Unknown Active              Current Facility-Administered Medications   Medication Dose Route Frequency Provider Last Rate Last Admin   • lisinopril (PRINIVIL) tablet 5 mg   5 mg Oral DAILY Berto Mackey M.D.       • senna-docusate (PERICOLACE or SENOKOT S) 8.6-50 MG per tablet 2 Tablet  2 Tablet Oral BID Berto Mackey M.D.        And   • polyethylene glycol/lytes (MIRALAX) PACKET 1 Packet  1 Packet Oral QDAY PRN Berto Mackey M.D.        And   • magnesium hydroxide (MILK OF MAGNESIA) suspension 30 mL  30 mL Oral QDAY PRN Berto Mackey M.D.        And   • bisacodyl (DULCOLAX) suppository 10 mg  10 mg Rectal QDAY PRN Berto Mackey M.D.       • NS infusion   Intravenous Continuous Berto Mackey M.D. 100 mL/hr at 07/31/22 2343 New Bag at 07/31/22 2343   • acetaminophen (Tylenol) tablet 650 mg  650 mg Oral Q6HRS PRN Berto Mackey M.D.       • ondansetron (ZOFRAN) syringe/vial injection 4 mg  4 mg Intravenous Q4HRS PRN Berto Mackey M.D.       • ondansetron (ZOFRAN ODT) dispertab 4 mg  4 mg Oral Q4HRS PRN Berto Mackey M.D.       • promethazine (PHENERGAN) tablet 12.5-25 mg  12.5-25 mg Oral Q4HRS PRN Berto Mackey M.D.       • promethazine (PHENERGAN) suppository 12.5-25 mg  12.5-25 mg Rectal Q4HRS PRN Berto Mackey M.D.       • prochlorperazine (COMPAZINE) injection 5-10 mg  5-10 mg Intravenous Q4HRS PRN Berto Mackey M.D.       • insulin regular (HumuLIN R,NovoLIN R) injection  2-9 Units Subcutaneous 4X/DAY JACQUELYN Mackey M.D.        And   • dextrose 50% (D50W) injection 25 g  25 g Intravenous Q15 MIN PRN Berto Mackey M.D.       • ampicillin/sulbactam (UNASYN) 3 g in  mL IVPB  3 g Intravenous Q6HRS Berto Mackey M.D.   Stopped at 08/01/22 0013   • oxyCODONE immediate-release (ROXICODONE) tablet 5 mg  5 mg Oral Q4HRS PRN Berto Mackey M.D.       • morphine 4 MG/ML injection 2 mg  2 mg Intravenous Q4HRS PRN Berto Mackey M.D.   2 mg at 07/31/22 9094       Allergies  Allergies   Allergen Reactions   • Hydrocodone-Acetaminophen      itchy       Vital Signs last 24 hours  Temp:  [36.1 °C (97 °F)-37.5  °C (99.5 °F)] 37.2 °C (98.9 °F)  Pulse:  [] 82  Resp:  [15-18] 16  BP: (104-156)/(59-89) 128/74  SpO2:  [91 %-100 %] 97 %    Physical Exam  Physical Exam  Vitals and nursing note reviewed.   Constitutional:       Appearance: He is ill-appearing.      Comments: Thin, chronically ill-appearing   HENT:      Head: Normocephalic and atraumatic.      Right Ear: External ear normal.      Left Ear: External ear normal.      Nose: Nose normal.      Mouth/Throat:      Comments: Perimandibular swelling with slight intraoral bleeding  Eyes:      Extraocular Movements: Extraocular movements intact.      Conjunctiva/sclera: Conjunctivae normal.   Cardiovascular:      Rate and Rhythm: Normal rate and regular rhythm.   Pulmonary:      Effort: Pulmonary effort is normal.      Breath sounds: Normal breath sounds.   Abdominal:      General: Abdomen is flat. There is no distension.      Palpations: Abdomen is soft.      Tenderness: There is no abdominal tenderness.   Musculoskeletal:         General: Normal range of motion.      Cervical back: Neck supple.   Skin:     General: Skin is warm and dry.      Capillary Refill: Capillary refill takes less than 2 seconds.   Neurological:      General: No focal deficit present.      Mental Status: He is alert and oriented to person, place, and time. Mental status is at baseline.      Cranial Nerves: No cranial nerve deficit.      Sensory: No sensory deficit.      Motor: No weakness.   Psychiatric:         Mood and Affect: Mood normal.         Behavior: Behavior normal.         Fluids    Intake/Output Summary (Last 24 hours) at 8/1/2022 0355  Last data filed at 7/31/2022 2300  Gross per 24 hour   Intake 500 ml   Output 1620 ml   Net -1120 ml       Laboratory  Recent Results (from the past 48 hour(s))   CBC WITH DIFFERENTIAL    Collection Time: 07/31/22  4:01 AM   Result Value Ref Range    WBC 9.1 4.8 - 10.8 K/uL    RBC 4.29 (L) 4.70 - 6.10 M/uL    Hemoglobin 14.6 14.0 - 18.0 g/dL     Hematocrit 40.7 (L) 42.0 - 52.0 %    MCV 94.9 (H) 80.0 - 94.0 fL    MCH 34.0 (H) 28.7 - 33.1 pg    MCHC 35.9 33.0 - 37.0 g/dL    RDW 12.2 11.5 - 14.5 %    Platelet Count 135 130 - 400 K/uL    MPV 10.1 7.4 - 10.4 fL    Neutrophils Automated 71.8 (H) 39.0 - 70.0 %    Lymphocytes Automated 15.6 (L) 21.0 - 50.0 %    Monocytes Automated 11.6 (H) 1.7 - 10.0 %    Eosinophils Automated 0.2 0.0 - 5.0 %    Basophils Automated 0.8 0.0 - 3.0 %    Abs Neutrophils Automated 6.5 1.8 - 7.7 K/uL    Abs Lymph Automated 1.4 1.2 - 4.8 K/uL    Monos (Absolute) 1.1 (H) 0.2 - 0.9 K/uL   Comp Metabolic Panel    Collection Time: 07/31/22  4:01 AM   Result Value Ref Range    Sodium 132 (L) 137 - 145 mmol/L    Potassium 3.7 3.5 - 5.1 mmol/L    Chloride 94 (L) 98 - 107 mmol/L    Co2 17 (L) 22 - 30 mmol/L    Anion Gap 21 (H) 4 - 12 mmol/L    Glucose 374 (H) 70 - 100 mg/dL    Bun 7 (L) 9 - 20 mg/dL    Creatinine 0.5 (L) 0.7 - 1.3 mg/dL    Calcium 8.8 8.7 - 10.5 mg/dL    AST(SGOT) 38 15 - 46 U/L    ALT(SGPT) 59 13 - 69 U/L    Alkaline Phosphatase 115 38 - 126 U/L    Total Bilirubin 1.7 (H) 0.2 - 1.3 mg/dL    Albumin 4.5 3.5 - 5.0 g/dL    Total Protein 6.9 6.3 - 8.2 g/dL    A-G Ratio 1.9    HEMOGLOBIN A1C    Collection Time: 07/31/22  4:01 AM   Result Value Ref Range    Glycohemoglobin 12.9 (H) 4.0 - 5.6 %    Est Avg Glucose 324 mg/dL   ESTIMATED GFR    Collection Time: 07/31/22  4:01 AM   Result Value Ref Range    GFR (CKD-EPI) 135 >60 mL/min/1.73 m 2   COVID/SARS CoV-2 PCR RAPID    Collection Time: 07/31/22  5:30 AM    Specimen: Nasopharyngeal; Respirate   Result Value Ref Range    COVID Order Status See below    CoV-2, Flu A/B, And RSV by PCR (Cepheid)    Collection Time: 07/31/22  5:30 AM   Result Value Ref Range    Influenza virus A RNA Negative Negative    Influenza virus B, PCR Negative Negative    RSV, PCR Not Detected Not Detected    SARS-CoV-2 by PCR Negative Negative    SARS-CoV-2 Source NP Swab    CBC WITH DIFFERENTIAL    Collection  Time: 07/31/22  9:30 AM   Result Value Ref Range    WBC 9.3 4.8 - 10.8 K/uL    RBC 4.18 (L) 4.70 - 6.10 M/uL    Hemoglobin 14.3 14.0 - 18.0 g/dL    Hematocrit 39.9 (L) 42.0 - 52.0 %    MCV 95.5 81.4 - 97.8 fL    MCH 34.2 (H) 27.0 - 33.0 pg    MCHC 35.8 (H) 33.7 - 35.3 g/dL    RDW 43.8 35.9 - 50.0 fL    Platelet Count 121 (L) 164 - 446 K/uL    MPV 10.5 9.0 - 12.9 fL    Neutrophils-Polys 70.60 44.00 - 72.00 %    Lymphocytes 15.50 (L) 22.00 - 41.00 %    Monocytes 12.80 0.00 - 13.40 %    Eosinophils 0.20 0.00 - 6.90 %    Basophils 0.50 0.00 - 1.80 %    Immature Granulocytes 0.40 0.00 - 0.90 %    Nucleated RBC 0.00 /100 WBC    Neutrophils (Absolute) 6.53 1.82 - 7.42 K/uL    Lymphs (Absolute) 1.43 1.00 - 4.80 K/uL    Monos (Absolute) 1.18 (H) 0.00 - 0.85 K/uL    Eos (Absolute) 0.02 0.00 - 0.51 K/uL    Baso (Absolute) 0.05 0.00 - 0.12 K/uL    Immature Granulocytes (abs) 0.04 0.00 - 0.11 K/uL    NRBC (Absolute) 0.00 K/uL   CMP    Collection Time: 07/31/22  9:30 AM   Result Value Ref Range    Sodium 139 135 - 145 mmol/L    Potassium 3.3 (L) 3.6 - 5.5 mmol/L    Chloride 101 96 - 112 mmol/L    Co2 14 (L) 20 - 33 mmol/L    Anion Gap 24.0 (H) 7.0 - 16.0    Glucose 173 (H) 65 - 99 mg/dL    Bun 7 (L) 8 - 22 mg/dL    Creatinine 0.39 (L) 0.50 - 1.40 mg/dL    Calcium 7.8 (L) 8.5 - 10.5 mg/dL    AST(SGOT) 29 12 - 45 U/L    ALT(SGPT) 40 2 - 50 U/L    Alkaline Phosphatase 86 30 - 99 U/L    Total Bilirubin 1.3 0.1 - 1.5 mg/dL    Albumin 3.8 3.2 - 4.9 g/dL    Total Protein 6.4 6.0 - 8.2 g/dL    Globulin 2.6 1.9 - 3.5 g/dL    A-G Ratio 1.5 g/dL   ESTIMATED GFR    Collection Time: 07/31/22  9:30 AM   Result Value Ref Range    GFR (CKD-EPI) 145 >60 mL/min/1.73 m 2   POCT glucose device results    Collection Time: 07/31/22  8:28 PM   Result Value Ref Range    POC Glucose, Blood 197 (H) 65 - 99 mg/dL   CULTURE WOUND W/ GRAM STAIN    Collection Time: 07/31/22  9:35 PM    Specimen: Wound   Result Value Ref Range    Significant Indicator NEG      Source WND     Site Left Peritonsillar Abscess     Culture Result -     Gram Stain Result       Moderate Gram positive rods.  Moderate Gram positive cocci.  Few WBCs.     Anaerobic Culture    Collection Time: 07/31/22  9:35 PM    Specimen: Wound   Result Value Ref Range    Significant Indicator NEG     Source WND     Site Left Peritonsillar Abscess     Culture Result -    GRAM STAIN    Collection Time: 07/31/22  9:35 PM    Specimen: Wound   Result Value Ref Range    Significant Indicator .     Source WND     Site Left Peritonsillar Abscess     Gram Stain Result       Moderate Gram positive rods.  Moderate Gram positive cocci.  Few WBCs.     CBC WITH DIFFERENTIAL    Collection Time: 08/01/22  2:41 AM   Result Value Ref Range    WBC 12.0 (H) 4.8 - 10.8 K/uL    RBC 4.77 4.70 - 6.10 M/uL    Hemoglobin 16.1 14.0 - 18.0 g/dL    Hematocrit 47.4 42.0 - 52.0 %    MCV 99.4 (H) 81.4 - 97.8 fL    MCH 33.8 (H) 27.0 - 33.0 pg    MCHC 34.0 33.7 - 35.3 g/dL    RDW 46.5 35.9 - 50.0 fL    Platelet Count 156 (L) 164 - 446 K/uL    MPV 9.9 9.0 - 12.9 fL    Neutrophils-Polys 83.90 (H) 44.00 - 72.00 %    Lymphocytes 4.60 (L) 22.00 - 41.00 %    Monocytes 9.50 0.00 - 13.40 %    Eosinophils 1.00 0.00 - 6.90 %    Basophils 0.30 0.00 - 1.80 %    Immature Granulocytes 0.70 0.00 - 0.90 %    Nucleated RBC 0.00 /100 WBC    Neutrophils (Absolute) 10.02 (H) 1.82 - 7.42 K/uL    Lymphs (Absolute) 0.55 (L) 1.00 - 4.80 K/uL    Monos (Absolute) 1.14 (H) 0.00 - 0.85 K/uL    Eos (Absolute) 0.12 0.00 - 0.51 K/uL    Baso (Absolute) 0.04 0.00 - 0.12 K/uL    Immature Granulocytes (abs) 0.08 0.00 - 0.11 K/uL    NRBC (Absolute) 0.00 K/uL   Comp Metabolic Panel    Collection Time: 08/01/22  2:41 AM   Result Value Ref Range    Sodium 134 (L) 135 - 145 mmol/L    Potassium 3.5 (L) 3.6 - 5.5 mmol/L    Chloride 97 96 - 112 mmol/L    Co2 7 (LL) 20 - 33 mmol/L    Anion Gap 30.0 (H) 7.0 - 16.0    Glucose 328 (H) 65 - 99 mg/dL    Bun 13 8 - 22 mg/dL    Creatinine 0.69  0.50 - 1.40 mg/dL    Calcium 7.8 (L) 8.5 - 10.5 mg/dL    AST(SGOT) 49 (H) 12 - 45 U/L    ALT(SGPT) 48 2 - 50 U/L    Alkaline Phosphatase 112 (H) 30 - 99 U/L    Total Bilirubin 0.9 0.1 - 1.5 mg/dL    Albumin 4.0 3.2 - 4.9 g/dL    Total Protein 7.0 6.0 - 8.2 g/dL    Globulin 3.0 1.9 - 3.5 g/dL    A-G Ratio 1.3 g/dL   MAGNESIUM    Collection Time: 08/01/22  2:41 AM   Result Value Ref Range    Magnesium 2.1 1.5 - 2.5 mg/dL   ESTIMATED GFR    Collection Time: 08/01/22  2:41 AM   Result Value Ref Range    GFR (CKD-EPI) 122 >60 mL/min/1.73 m 2       Imaging  CT-FOREIGN FILM CAT SCAN   Final Result      [id number]   Final Result      1.  Several absent right mandibular teeth. No periapical lucencies.   2.  No mandibular fracture.          Assessment/Plan  * Abscess- (present on admission)  Assessment & Plan  2.2 cm abscess on the buccal surface of the left mandible  Status post I&D by Dr. Burnette, OMFS  Continue antimicrobials  Airway intact    Hypokalemia  Assessment & Plan  Replete to maintain greater than 4    High anion gap metabolic acidosis- (present on admission)  Assessment & Plan  Worsening throughout his stay  Presumed to be due to DKA: Beta hydroxybutyric acid and lactate pending  ICU admission, cardiac monitoring  Optimize intravascular volume with additional 2 L IVF bolus  DKA protocol with insulin drip at 0.05 units/kg/hr  Every hour Accu-Cheks, every 4 hour BMP, mag, phos  Goal Magnesium: >2, Phosphorus: 2, K >4  Will transition to sliding scale insulin when anion gap <12, CO2 > 17    Type 2 diabetes mellitus without complication, without long-term current use of insulin (HCC)- (present on admission)  Assessment & Plan  Now with  likely DKA  Ketones pending  DKA protocol    Hypertension- (present on admission)  Assessment & Plan  Patient reportedly not taking his home antihypertensive medications    Hypertriglyceridemia- (present on admission)  Assessment & Plan  Patient reportedly not taking his  home Tricor or Lipitor    Tobacco abuse- (present on admission)  Assessment & Plan  Cessation counseling prior to discharge    Hyponatremia- (present on admission)  Assessment & Plan  Pseudohyponatremia  Monitor as glucose corrects      Discussed patient condition and risk of morbidity and/or mortality with Hospitalist, RN, RT, Pharmacy, Dietary, Charge nurse / hot rounds and Patient.      The patient remains critically ill.  Critical care time = 40 minutes in directly providing and coordinating critical care and extensive data review.  No time overlap and excludes procedures.

## 2022-08-01 NOTE — PROGRESS NOTES
Daughter Alla at bedside. Wife made aware that patient is out of surgery and resting comfortably.

## 2022-08-01 NOTE — ANESTHESIA PREPROCEDURE EVALUATION
Case: [id number] Date/Time: 07/31/22 2116    Procedures:       EXTRACTION, TOOTH      INCISION AND DRAINAGE, ABSCESS, SUBMANDIBULAR REGION    Location: [facility] OR  / SURGERY [facility]    Surgeons: Miah Mcmahon D.D.S.          Relevant Problems   CARDIAC   (positive) Hypertension      ENDO   (positive) Type 2 diabetes mellitus without complication, without long-term current use of insulin (HCC)      Other   (positive) Abscess       Physical Exam    Airway   Mallampati: IV  TM distance: >3 FB  Neck ROM: full    Comments: Poor mouth opening and left sided jaw/neck swelling   Cardiovascular - normal exam  Rhythm: regular  Rate: normal  (-) murmur     Dental - normal exam           Pulmonary - normal exam  Breath sounds clear to auscultation     Abdominal    Neurological - normal exam                 Anesthesia Plan    ASA 3   ASA physical status 3 criteria: diabetes - poorly controlled    Plan - general       Airway plan will be ETT    (Plan for sedated but spontaneously breathing look with glidescope, if there is a good view then proceed with induction. If there is not a good view, then will do sedated/awake FOI. )      Induction: intravenous    Postoperative Plan: Postoperative administration of opioids is intended.    Pertinent diagnostic labs and testing reviewed    Informed Consent:    Anesthetic plan and risks discussed with patient.    Use of blood products discussed with: patient whom consented to blood products.

## 2022-08-01 NOTE — CARE PLAN
Problem: Pain - Standard  Goal: Alleviation of pain or a reduction in pain to the patient’s comfort goal  Outcome: Progressing     Problem: Knowledge Deficit - Standard  Goal: Patient and family/care givers will demonstrate understanding of plan of care, disease process/condition, diagnostic tests and medications  Outcome: Progressing   The patient is Stable - Low risk of patient condition declining or worsening    Shift Goals  Clinical Goals: pain management  Patient Goals: sleep surgery  Family Goals: support; education; update to POC    Progress made toward(s) clinical / shift goals:  patient states pain lessened    Patient is not progressing towards the following goals:

## 2022-08-01 NOTE — OP REPORT
Operative Report  Oral & Facial Surgery  Dental Extractions    Pt Name:  Jordin De Leon Jr.     Date of Surgery: 7/31/2022     Surgeon:  Miah Mcmahon DDS    Assistant: None    Pre-Op Diagnosis:     Dental Decay on toohth #19   peritonsilar abscess   Lateral pharyngeal abscess    Post Op Diagnosis:       Same    Operation Performed:           Surgical Extraction of tooth #19   Cmplicatted Intraoral I&D of lateral pharyngeal space abscess    Description of Surgery    The pt was brought to the operating room by the anesthesia team.  A time out was performed and consents were verified.  The pt was then uneventfully induced into general anesthesia.  A endotracheal tube was inserted and secured by the anesthesia team.  An oropharyngeal throat pack was placed.  approximately 10 mL of 1% lidocaine with 1:100K epi was administered to the proposed surgical sites.     A 15 blade was used to make an incision on the mandibular left vestibule in the area of #19.  Blunt dissection was then carried out to the bone and no significant purulence was encountered.  Copious irrigation was placed in the wound.  Explored to IBOM on the facial and lingual after developin FTMPF on the lingual plate.      15 blade was used medial to the retormolar pad in the area of the peritonsilar area.  Blunt dissection carried in all directions and purulence encountred.  Drained approximately 3 cc of pus.  Culture taken.  Finger dissection fo the area.  Copius irrigation to the area.      A 15 blade was used to make a sulcular incision along the sites to be extracted.  A full thickness mucoperiosteal flap was elevated.  Tooth # 19 was removed with appropriate bone removal and sectioning with forceps and elevators.  Root was unimpressive and no significant granulation tissue present.  Rotary instrucments used to remove tooth.  Rongeurs and bone files were used to smooth the alveolus of the bone.  Copious irrigation was placed in the surgical sites.       The throat pack was then removed and hemostasis achieved.  The pt was then turned back over to the anesthesia team, was awakened and extubated uneventfully and taken to the PACU in stable condition.      Estimated Blood Loss:  75  Needle and sponge count:  Correct  Specimens Removed:  Tooth # 19, lateral pharyngeal / peritonsilar abscess    Miah Mcmahon DDS

## 2022-08-01 NOTE — ASSESSMENT & PLAN NOTE
2.2 cm abscess on the buccal surface of the left mandible  Status post I&D by Dr. Burnette, OMFS  Continue antimicrobials  Airway intact

## 2022-08-01 NOTE — ANESTHESIA TIME REPORT
Anesthesia Start and Stop Event Times     Date Time Event    7/31/2022 2110 Ready for Procedure     2116 Anesthesia Start     2154 Anesthesia Stop        Responsible Staff  07/31/22    Name Role Begin End    Ruslan Yu M.D. Anesth 2116 2154        Overtime Reason:  no overtime (within assigned shift)    Comments:

## 2022-08-01 NOTE — ASSESSMENT & PLAN NOTE
Worsening throughout his stay  Presumed to be due to DKA: Beta hydroxybutyric acid and lactate pending  ICU admission, cardiac monitoring  Optimize intravascular volume with additional 2 L IVF bolus  DKA protocol with insulin drip at 0.05 units/kg/hr  Every hour Accu-Cheks, every 4 hour BMP, mag, phos  Goal Magnesium: >2, Phosphorus: 2, K >4  Will transition to sliding scale insulin when anion gap <12, CO2 > 17

## 2022-08-01 NOTE — PROGRESS NOTES
Reason for call:  Other   Patient called regarding (reason for call): forms  Additional comments: [facility] advised he is faxing over a form and if it could be filled out and sent back over that would be great.     Phone number to reach patient: Escobar DAVIS CM #: [phone]      Best Time:  If needed    Can we leave a detailed message on this number?  YES    Travel screening: Not Applicable     Received critical lab CO 7 from lab. Hospitalist notified. No new orders received

## 2022-08-01 NOTE — OR NURSING
2155 Pt from OR, asleep, with O2 support of 6 L/min via mask, respirations regular and spontaneous, vital signs within normal limits. Pt with oral packing/dry gauze, intact.     2230 Pt awake, educated regarding the importance of gauze/dressing inside mouth. Pt also restless, medicated per MAR.    2300 Report given do Mary Anne RN.    2325 Pt A&Ox4, VSS, to room with transport. 1 bag of belongings in the pt's bed, transport is aware.

## 2022-08-01 NOTE — PROGRESS NOTES
Pt tolerated extractions and I&D well. Good prognosis for recovery, however no clear souce of infection was identified.  Infection was very posterior and medial from normal odontogenic infection.  Would keep on IV abx until tomorrow and watch Blood sugar level.  Possible opertunistic infection.  When pt showing improvement then OK for DC.  I will continue to follow if pt stays in hosptial.  If DC in the AM, recommend DC with Augmentin, peridex rinse, and pain meds  Thank you,  Miah Mcmahon, DDS  [phone]

## 2022-08-01 NOTE — CONSULTS
Oral & Facial Surgery   Consultation Note    ID:  Jordin De Leon      HPI:  Pt was eating a sandwich 4 day sago and had significant pain on tooth #19.  Pt with multi day hx of facial pain and swelling with trismus present.  Presetned to ED ani rodriguez and tx.  OFMS consulted.      Chief Complaint:  Cannot open mouth     Exam:   Gen:  AAO x 3, NAD  Head:  NCAT  Eyes:  PERRLA, EOMI  Ears:  EAC Clear  Nose:  Nares patent, no d/c, no heme  Mouth:  Difficult to exam due to neal  10 Mm, tooth # 19 with tenderness to palpation.  #18 seems wnl.  #20 seems WNL.  Swelling in area of left mandible.  FOM is soft, but slightly tender.  Tissue locally erythematous lateral to mandible with rik induration present.  L V3 paresthesia.    Throat:  OP Clear    Imaging:   Maxillofacial CT:  Abscess located at left submandibular space and lateral mandible.    Pano:  No obvious signs of PARL       Assessment:  Facial abscess possible from odontogenic infection    Discussed R/B/A with pt regarding extractions with I&D.  Discussed risks such as pain, infection, bleeding, swelling, need for additional surgery, numbness, muscle weakness, nerve damage, as well as damage to adjacent structures.  Discussed possibility of penrose drain placement and need for good oral hygiene and cleaning/flushing of the drain.  Emphasized follow up.  All questions answered, and pt verbalized understanding and agreement.  Written consents obtained.  .      Plan:    To OR for I&D with exts  Will have better exam when pt is asleep and can open mouht.      Thank You,  Miah Mcmahon, DDS  [phone]

## 2022-08-01 NOTE — PROGRESS NOTES
NOC APRN CROSS COVER NOTE      Contacted by nursing regarding critical lab, CO2 of 7. Of note, glucose is elevated at 328 and anion gap is 30.    Patient admitted from outlying facility for peritonsillar abscess. He underwent tooth extractions and I&D with Dr. Mcmahon last night. He has a past medical history significant for hypertension, dyslipidemia and poorly controlled diabetes.    Plan: STAT beta-hydroxybutyric acid.  Transfer to ICU for DKA protocol.    I have reached and out spoken with Dr. Fenton from ICU. He will graciously transfer patient to ICU for DKA protocol.      Alison Renee ACN-AG, NOC Hospitalist TIMOTHY

## 2022-08-01 NOTE — ANESTHESIA PROCEDURE NOTES
Airway    Date/Time: 7/31/2022 9:25 PM  Performed by: Ruslan Yu M.D.  Authorized by: Ruslan Yu M.D.     Location:  OR  Urgency:  Elective  Indications for Airway Management:  Anesthesia      Spontaneous Ventilation: absent    Sedation Level:  Deep  Preoxygenated: Yes    Patient Position:  Sniffing  Mask Difficulty Assessment:  1 - vent by mask  Final Airway Type:  Endotracheal airway  Final Endotracheal Airway:  ETT  Cuffed: Yes    Technique Used for Successful ETT Placement:  Direct laryngoscopy    Insertion Site:  Oral  Blade Type:  Glide  Laryngoscope Blade/Videolaryngoscope Blade Size:  3  ETT Size (mm):  7.0  Measured from:  Lips  Placement Verified by: auscultation and capnometry    Cormack-Lehane Classification:  Grade IIa - partial view of glottis  Number of Attempts at Approach:  1  Number of Other Approaches Attempted:  0   Mouth/airway anesthetized in pre-op with lidocaine nebulizer and lidocaine popsicle. Once in the OR and monitors on, pt sedated with midazolam, ketamine, and low dose propofol. Pt very sedated but following commands. Glidescope inserted and able to obtain adequate view. Decision then made to proceed with induction as normal. Pt was induced with propofol and rocuronium and was intubated with the glidescope without difficulty.

## 2022-08-02 PROBLEM — E10.10 DKA, TYPE 1, NOT AT GOAL (HCC): Status: ACTIVE | Noted: 2022-08-02

## 2022-08-02 LAB
ANION GAP SERPL CALC-SCNC: 10 MMOL/L (ref 7–16)
ANION GAP SERPL CALC-SCNC: 12 MMOL/L (ref 7–16)
ANION GAP SERPL CALC-SCNC: 14 MMOL/L (ref 7–16)
ANION GAP SERPL CALC-SCNC: 9 MMOL/L (ref 7–16)
BACTERIA WND AEROBE CULT: NORMAL
BASOPHILS # BLD AUTO: 0.3 % (ref 0–1.8)
BASOPHILS # BLD: 0.02 K/UL (ref 0–0.12)
BUN SERPL-MCNC: 10 MG/DL (ref 8–22)
BUN SERPL-MCNC: 8 MG/DL (ref 8–22)
BUN SERPL-MCNC: 8 MG/DL (ref 8–22)
BUN SERPL-MCNC: 9 MG/DL (ref 8–22)
CALCIUM SERPL-MCNC: 6.1 MG/DL (ref 8.5–10.5)
CALCIUM SERPL-MCNC: 7.7 MG/DL (ref 8.5–10.5)
CALCIUM SERPL-MCNC: 7.8 MG/DL (ref 8.5–10.5)
CALCIUM SERPL-MCNC: 7.9 MG/DL (ref 8.5–10.5)
CHLORIDE SERPL-SCNC: 100 MMOL/L (ref 96–112)
CHLORIDE SERPL-SCNC: 100 MMOL/L (ref 96–112)
CHLORIDE SERPL-SCNC: 102 MMOL/L (ref 96–112)
CHLORIDE SERPL-SCNC: 109 MMOL/L (ref 96–112)
CO2 SERPL-SCNC: 18 MMOL/L (ref 20–33)
CO2 SERPL-SCNC: 21 MMOL/L (ref 20–33)
CO2 SERPL-SCNC: 23 MMOL/L (ref 20–33)
CO2 SERPL-SCNC: 24 MMOL/L (ref 20–33)
CREAT SERPL-MCNC: 0.34 MG/DL (ref 0.5–1.4)
CREAT SERPL-MCNC: 0.38 MG/DL (ref 0.5–1.4)
CREAT SERPL-MCNC: 0.4 MG/DL (ref 0.5–1.4)
CREAT SERPL-MCNC: 0.43 MG/DL (ref 0.5–1.4)
EOSINOPHIL # BLD AUTO: 0.02 K/UL (ref 0–0.51)
EOSINOPHIL NFR BLD: 0.3 % (ref 0–6.9)
ERYTHROCYTE [DISTWIDTH] IN BLOOD BY AUTOMATED COUNT: 44.3 FL (ref 35.9–50)
GFR SERPLBLD CREATININE-BSD FMLA CKD-EPI: 141 ML/MIN/1.73 M 2
GFR SERPLBLD CREATININE-BSD FMLA CKD-EPI: 144 ML/MIN/1.73 M 2
GFR SERPLBLD CREATININE-BSD FMLA CKD-EPI: 146 ML/MIN/1.73 M 2
GFR SERPLBLD CREATININE-BSD FMLA CKD-EPI: 151 ML/MIN/1.73 M 2
GLUCOSE BLD STRIP.AUTO-MCNC: 138 MG/DL (ref 65–99)
GLUCOSE BLD STRIP.AUTO-MCNC: 144 MG/DL (ref 65–99)
GLUCOSE BLD STRIP.AUTO-MCNC: 160 MG/DL (ref 65–99)
GLUCOSE BLD STRIP.AUTO-MCNC: 190 MG/DL (ref 65–99)
GLUCOSE BLD STRIP.AUTO-MCNC: 205 MG/DL (ref 65–99)
GLUCOSE BLD STRIP.AUTO-MCNC: 207 MG/DL (ref 65–99)
GLUCOSE BLD STRIP.AUTO-MCNC: 208 MG/DL (ref 65–99)
GLUCOSE SERPL-MCNC: 179 MG/DL (ref 65–99)
GLUCOSE SERPL-MCNC: 208 MG/DL (ref 65–99)
GLUCOSE SERPL-MCNC: 209 MG/DL (ref 65–99)
GLUCOSE SERPL-MCNC: 211 MG/DL (ref 65–99)
GRAM STN SPEC: NORMAL
HCT VFR BLD AUTO: 31.2 % (ref 42–52)
HGB BLD-MCNC: 10.9 G/DL (ref 14–18)
IMM GRANULOCYTES # BLD AUTO: 0.01 K/UL (ref 0–0.11)
IMM GRANULOCYTES NFR BLD AUTO: 0.2 % (ref 0–0.9)
LYMPHOCYTES # BLD AUTO: 0.73 K/UL (ref 1–4.8)
LYMPHOCYTES NFR BLD: 12.5 % (ref 22–41)
MAGNESIUM SERPL-MCNC: 1.4 MG/DL (ref 1.5–2.5)
MAGNESIUM SERPL-MCNC: 1.8 MG/DL (ref 1.5–2.5)
MAGNESIUM SERPL-MCNC: 2.2 MG/DL (ref 1.5–2.5)
MAGNESIUM SERPL-MCNC: 2.4 MG/DL (ref 1.5–2.5)
MCH RBC QN AUTO: 33.6 PG (ref 27–33)
MCHC RBC AUTO-ENTMCNC: 34.9 G/DL (ref 33.7–35.3)
MCV RBC AUTO: 96.3 FL (ref 81.4–97.8)
MONOCYTES # BLD AUTO: 0.72 K/UL (ref 0–0.85)
MONOCYTES NFR BLD AUTO: 12.3 % (ref 0–13.4)
NEUTROPHILS # BLD AUTO: 4.36 K/UL (ref 1.82–7.42)
NEUTROPHILS NFR BLD: 74.4 % (ref 44–72)
NRBC # BLD AUTO: 0 K/UL
NRBC BLD-RTO: 0 /100 WBC
PHOSPHATE SERPL-MCNC: 1.2 MG/DL (ref 2.5–4.5)
PHOSPHATE SERPL-MCNC: 1.3 MG/DL (ref 2.5–4.5)
PHOSPHATE SERPL-MCNC: 1.6 MG/DL (ref 2.5–4.5)
PHOSPHATE SERPL-MCNC: 2.2 MG/DL (ref 2.5–4.5)
PLATELET # BLD AUTO: 124 K/UL (ref 164–446)
PMV BLD AUTO: 9.9 FL (ref 9–12.9)
POTASSIUM SERPL-SCNC: 2.8 MMOL/L (ref 3.6–5.5)
POTASSIUM SERPL-SCNC: 3.3 MMOL/L (ref 3.6–5.5)
POTASSIUM SERPL-SCNC: 3.7 MMOL/L (ref 3.6–5.5)
POTASSIUM SERPL-SCNC: 4 MMOL/L (ref 3.6–5.5)
RBC # BLD AUTO: 3.24 M/UL (ref 4.7–6.1)
SIGNIFICANT IND 70042: NORMAL
SITE SITE: NORMAL
SODIUM SERPL-SCNC: 132 MMOL/L (ref 135–145)
SODIUM SERPL-SCNC: 135 MMOL/L (ref 135–145)
SODIUM SERPL-SCNC: 136 MMOL/L (ref 135–145)
SODIUM SERPL-SCNC: 139 MMOL/L (ref 135–145)
SOURCE SOURCE: NORMAL
WBC # BLD AUTO: 5.9 K/UL (ref 4.8–10.8)

## 2022-08-02 PROCEDURE — 80048 BASIC METABOLIC PNL TOTAL CA: CPT | Mod: 91

## 2022-08-02 PROCEDURE — 700101 HCHG RX REV CODE 250: Performed by: STUDENT IN AN ORGANIZED HEALTH CARE EDUCATION/TRAINING PROGRAM

## 2022-08-02 PROCEDURE — A9270 NON-COVERED ITEM OR SERVICE: HCPCS | Performed by: INTERNAL MEDICINE

## 2022-08-02 PROCEDURE — 700111 HCHG RX REV CODE 636 W/ 250 OVERRIDE (IP): Performed by: INTERNAL MEDICINE

## 2022-08-02 PROCEDURE — 700102 HCHG RX REV CODE 250 W/ 637 OVERRIDE(OP): Performed by: INTERNAL MEDICINE

## 2022-08-02 PROCEDURE — 82962 GLUCOSE BLOOD TEST: CPT | Mod: 91

## 2022-08-02 PROCEDURE — 83735 ASSAY OF MAGNESIUM: CPT | Mod: 91

## 2022-08-02 PROCEDURE — 700102 HCHG RX REV CODE 250 W/ 637 OVERRIDE(OP): Performed by: STUDENT IN AN ORGANIZED HEALTH CARE EDUCATION/TRAINING PROGRAM

## 2022-08-02 PROCEDURE — A9270 NON-COVERED ITEM OR SERVICE: HCPCS | Performed by: STUDENT IN AN ORGANIZED HEALTH CARE EDUCATION/TRAINING PROGRAM

## 2022-08-02 PROCEDURE — 99232 SBSQ HOSP IP/OBS MODERATE 35: CPT | Performed by: INTERNAL MEDICINE

## 2022-08-02 PROCEDURE — 700105 HCHG RX REV CODE 258: Performed by: STUDENT IN AN ORGANIZED HEALTH CARE EDUCATION/TRAINING PROGRAM

## 2022-08-02 PROCEDURE — 85025 COMPLETE CBC W/AUTO DIFF WBC: CPT

## 2022-08-02 PROCEDURE — 84100 ASSAY OF PHOSPHORUS: CPT

## 2022-08-02 PROCEDURE — 770001 HCHG ROOM/CARE - MED/SURG/GYN PRIV*

## 2022-08-02 PROCEDURE — 700111 HCHG RX REV CODE 636 W/ 250 OVERRIDE (IP): Performed by: STUDENT IN AN ORGANIZED HEALTH CARE EDUCATION/TRAINING PROGRAM

## 2022-08-02 RX ORDER — POTASSIUM CHLORIDE 7.45 MG/ML
10 INJECTION INTRAVENOUS
Status: DISCONTINUED | OUTPATIENT
Start: 2022-08-02 | End: 2022-08-02

## 2022-08-02 RX ORDER — POTASSIUM CHLORIDE 20 MEQ/1
40 TABLET, EXTENDED RELEASE ORAL DAILY
Status: DISCONTINUED | OUTPATIENT
Start: 2022-08-02 | End: 2022-08-04

## 2022-08-02 RX ORDER — CALCIUM GLUCONATE 20 MG/ML
2 INJECTION, SOLUTION INTRAVENOUS ONCE
Status: COMPLETED | OUTPATIENT
Start: 2022-08-02 | End: 2022-08-02

## 2022-08-02 RX ORDER — MAGNESIUM SULFATE HEPTAHYDRATE 40 MG/ML
4 INJECTION, SOLUTION INTRAVENOUS ONCE
Status: COMPLETED | OUTPATIENT
Start: 2022-08-02 | End: 2022-08-02

## 2022-08-02 RX ADMIN — INSULIN GLARGINE-YFGN 11 UNITS: 100 INJECTION, SOLUTION SUBCUTANEOUS at 17:45

## 2022-08-02 RX ADMIN — INSULIN LISPRO 3 UNITS: 100 INJECTION, SOLUTION INTRAVENOUS; SUBCUTANEOUS at 21:48

## 2022-08-02 RX ADMIN — ENOXAPARIN SODIUM 30 MG: 30 INJECTION SUBCUTANEOUS at 17:42

## 2022-08-02 RX ADMIN — OXYCODONE 5 MG: 5 TABLET ORAL at 14:39

## 2022-08-02 RX ADMIN — AMOXICILLIN AND CLAVULANATE POTASSIUM 1 TABLET: 875; 125 TABLET, FILM COATED ORAL at 17:42

## 2022-08-02 RX ADMIN — CALCIUM GLUCONATE 2 G: 20 INJECTION, SOLUTION INTRAVENOUS at 05:21

## 2022-08-02 RX ADMIN — AMOXICILLIN AND CLAVULANATE POTASSIUM 1 TABLET: 875; 125 TABLET, FILM COATED ORAL at 05:22

## 2022-08-02 RX ADMIN — OXYCODONE 5 MG: 5 TABLET ORAL at 22:59

## 2022-08-02 RX ADMIN — ACETAMINOPHEN 650 MG: 325 TABLET, FILM COATED ORAL at 23:00

## 2022-08-02 RX ADMIN — OXYCODONE 5 MG: 5 TABLET ORAL at 09:37

## 2022-08-02 RX ADMIN — POTASSIUM CHLORIDE 40 MEQ: 1500 TABLET, EXTENDED RELEASE ORAL at 05:20

## 2022-08-02 RX ADMIN — INSULIN LISPRO 2 UNITS: 100 INJECTION, SOLUTION INTRAVENOUS; SUBCUTANEOUS at 08:58

## 2022-08-02 RX ADMIN — INSULIN LISPRO 4 UNITS: 100 INJECTION, SOLUTION INTRAVENOUS; SUBCUTANEOUS at 18:31

## 2022-08-02 RX ADMIN — OXYCODONE 5 MG: 5 TABLET ORAL at 18:42

## 2022-08-02 RX ADMIN — INSULIN LISPRO 3 UNITS: 100 INJECTION, SOLUTION INTRAVENOUS; SUBCUTANEOUS at 12:58

## 2022-08-02 RX ADMIN — POTASSIUM PHOSPHATE, MONOBASIC AND POTASSIUM PHOSPHATE, DIBASIC 30 MMOL: 224; 236 INJECTION, SOLUTION, CONCENTRATE INTRAVENOUS at 05:22

## 2022-08-02 RX ADMIN — SENNOSIDES AND DOCUSATE SODIUM 2 TABLET: 50; 8.6 TABLET ORAL at 17:42

## 2022-08-02 RX ADMIN — MORPHINE SULFATE 2 MG: 4 INJECTION INTRAVENOUS at 05:25

## 2022-08-02 RX ADMIN — INSULIN LISPRO 3 UNITS: 100 INJECTION, SOLUTION INTRAVENOUS; SUBCUTANEOUS at 17:46

## 2022-08-02 RX ADMIN — MORPHINE SULFATE 2 MG: 4 INJECTION INTRAVENOUS at 00:02

## 2022-08-02 RX ADMIN — LISINOPRIL 5 MG: 5 TABLET ORAL at 05:20

## 2022-08-02 RX ADMIN — MAGNESIUM SULFATE HEPTAHYDRATE 4 G: 40 INJECTION, SOLUTION INTRAVENOUS at 05:21

## 2022-08-02 RX ADMIN — OXYCODONE 5 MG: 5 TABLET ORAL at 05:21

## 2022-08-02 ASSESSMENT — ENCOUNTER SYMPTOMS
SORE THROAT: 1
GASTROINTESTINAL NEGATIVE: 1
NEUROLOGICAL NEGATIVE: 1
MUSCULOSKELETAL NEGATIVE: 1
CONSTITUTIONAL NEGATIVE: 1
RESPIRATORY NEGATIVE: 1
PSYCHIATRIC NEGATIVE: 1
CARDIOVASCULAR NEGATIVE: 1

## 2022-08-02 ASSESSMENT — PAIN DESCRIPTION - PAIN TYPE
TYPE: ACUTE PAIN

## 2022-08-02 ASSESSMENT — FIBROSIS 4 INDEX: FIB4 SCORE: 4.12

## 2022-08-02 NOTE — PROGRESS NOTES
Overnight progress note    Was called for significantly deranged electrolytes with K of 2.8, calcium 6.1, Phos 1.2, mag 1.4    -Repeating labs as ?  If dilution, however sodium and chloride are stable and patient is not receiving IV fluids.  -K-Phos, mag, calcium, and p.o. potassium ordered  -Electrolyte every 8 hours ordered    \Total critical care time: 10 minutes

## 2022-08-02 NOTE — PROGRESS NOTES
4 Eyes Skin Assessment Completed by MAYURI Crane and MAYURI Bills.    Head WDL  Ears Redness and Blanching  Nose WDL  Mouth Bleeding, swelling to left jaw extending to neck   Breast/Chest WDL  Shoulder Blades Redness and Blanching  Spine WDL  (R) Arm/Elbow/Hand WDL  (L) Arm/Elbow/Hand WDL  Abdomen Scar  Groin WDL  Scrotum/Coccyx/Buttocks Redness and Blanching  (R) Leg WDL  (L) Leg WDL  (R) Heel/Foot/Toe Redness, Blanching and Boggy  (L) Heel/Foot/Toe Redness, Blanching and Boggy          Devices In Places ECG, Blood Pressure Cuff, Pulse Ox and SCD's      Interventions In Place Pillows, Q2 Turns, Low Air Loss Mattress and Heels Loaded W/Pillows    Possible Skin Injury No    Pictures Uploaded Into Epic N/A  Wound Consult Placed N/A  RN Wound Prevention Protocol Ordered No

## 2022-08-02 NOTE — PROGRESS NOTES
[facility] Medicine Daily Progress Note    Date of Service  8/2/2022    Chief Complaint  Jordin De Leon Jr. is a 36 y.o. male admitted 7/31/2022 with neck swelling    Hospital Course    36-year-old male with history of uncontrolled diabetes hypertension and dyslipidemia presented 7/31 with neck pain and swelling.  Initially patient was seen at outside facility and transferred to our facility for higher level of care.  His symptoms started with tooth pain and later started having swelling on his jaw with severe pain, denied any fever or chills no chest pain or shortness of breath or other symptoms however patient was not able to eat.  On admission labs did not show leukocytosis..  His blood sugar was elevated 374 with A1c 12.9.  CT scan showed an abscess in the floor of the mouth on the left along of the body of the mandible.  IND was done by Dr. Mcmahon on 7/31, however patient developed worsening hyperglycemia with elevated anion gap.  Patient was transferred to the ICU for DKA, insulin drip with aggressive IV fluid was done with improving on his blood sugar and bicarb.      Interval Problem Update  -Evaluated examined the patient at bedside, patient feels better however still having tooth pain.   -His tooth was broken today, encouraged patient to follow-up with dentist  -No fever and improving on his leukocytosis and labs  -Patient is okay to be transferred out of the ICU by intensivist  -We will continue Lantus, order diabetes education, monitor his blood sugar today and possible discharge tomorrow on insulin.      I have discussed this patient's plan of care and discharge plan at IDT rounds today with Case Management, Nursing, Nursing leadership, and other members of the IDT team.    Consultants/Specialty  critical care and ENT    Code Status  Full Code    Disposition  Patient is not medically cleared for discharge.   Anticipate discharge to to home with close outpatient follow-up.  I have placed the  appropriate orders for post-discharge needs.    Review of Systems  Review of Systems   Constitutional: Negative.    HENT: Positive for sore throat.    Respiratory: Negative.    Cardiovascular: Negative.    Gastrointestinal: Negative.    Genitourinary: Negative.    Musculoskeletal: Negative.    Skin: Negative.    Neurological: Negative.    Psychiatric/Behavioral: Negative.         Physical Exam  Temp:  [36 °C (96.8 °F)-36.5 °C (97.7 °F)] 36.3 °C (97.3 °F)  Pulse:  [61-82] 73  Resp:  [10-21] 17  BP: ()/(57-80) 108/73  SpO2:  [92 %-99 %] 92 %    Physical Exam  Constitutional:       Appearance: He is not ill-appearing.   HENT:      Head: Normocephalic and atraumatic.   Eyes:      General: No scleral icterus.  Neck:      Comments: Improving on his swelling on the left side of his neck  Tooth pain  Cardiovascular:      Rate and Rhythm: Normal rate.      Heart sounds: No murmur heard.  Pulmonary:      Effort: No respiratory distress.      Breath sounds: No wheezing.   Abdominal:      General: There is no distension.      Tenderness: There is no abdominal tenderness. There is no right CVA tenderness, left CVA tenderness or guarding.   Musculoskeletal:      Cervical back: Tenderness present.      Right lower leg: No edema.      Left lower leg: No edema.   Lymphadenopathy:      Cervical: Cervical adenopathy present.   Skin:     Coloration: Skin is not jaundiced.      Findings: No bruising, lesion or rash.   Neurological:      General: No focal deficit present.      Mental Status: He is alert. Mental status is at baseline. He is disoriented.      Cranial Nerves: No cranial nerve deficit.      Motor: No weakness.      Gait: Gait normal.         Fluids    Intake/Output Summary (Last 24 hours) at 8/2/2022 1006  Last data filed at 8/2/2022 0921  Gross per 24 hour   Intake 3325.18 ml   Output 2850 ml   Net 475.18 ml       Laboratory  Recent Labs     07/31/22  0930 08/01/22  0241 08/02/22  0405   WBC 9.3 12.0* 5.9   RBC 4.18*  4.77 3.24*   HEMOGLOBIN 14.3 16.1 10.9*   HEMATOCRIT 39.9* 47.4 31.2*   MCV 95.5 99.4* 96.3   MCH 34.2* 33.8* 33.6*   MCHC 35.8* 34.0 34.9   RDW 43.8 46.5 44.3   PLATELETCT 121* 156* 124*   MPV 10.5 9.9 9.9     Recent Labs     08/01/22  1800 08/02/22  0405 08/02/22  0545   SODIUM 137 139 135   POTASSIUM 4.2 2.8* 3.3*   CHLORIDE 107 109 100   CO2 20 18* 21   GLUCOSE 179* 179* 211*   BUN 11 9 10   CREATININE 0.63 0.38* 0.43*   CALCIUM 8.2* 6.1* 7.9*                   Imaging  CT-FOREIGN FILM CAT SCAN   Final Result      [id number]   Final Result      1.  Several absent right mandibular teeth. No periapical lucencies.   2.  No mandibular fracture.           Assessment/Plan  * Abscess- (present on admission)  Assessment & Plan  CT scan showed abscess in the floor of the mouth on the left along of the body of the mandible  No sepsis and no leukocytosis  IND was done by oral surgeon on 7/31   IV Unasyn and IV fluid on admission and later switched to Augmentin  Control his blood sugar, continue Lantus  Blood sugar is negative  Continue Augmentin and follow-up with dentist and oral surgeon as outpatient    High anion gap metabolic acidosis- (present on admission)  Assessment & Plan  Likely related to dehydration and infection  IV fluid and labs daily  Close monitoring and control blood sugar    Type 2 diabetes mellitus without complication, without long-term current use of insulin (HCC)- (present on admission)  Assessment & Plan  Uncontrolled and A1c 12.9  Patient delivered DKA and needed ICU  DKA was resolved  Continue Lantus 20 units daily  Diabetes education  Holding home medications   discussed the risk of uncontrolled diabetes including not limited to kidney failure heart failure and death.      DKA, type 1, not at goal (HCC)- (present on admission)  Assessment & Plan  Improved  Continue IV fluid with Lantus  Monitor blood sugar closely  No need for insulin drip    Hypertriglyceridemia- (present on  admission)  Assessment & Plan   continue home medication fenofibrate  Follow-up with PCP    Hypertension- (present on admission)  Assessment & Plan  Continue amlodipine and lisinopril, home medication.       VTE prophylaxis: SCDs/TEDs and enoxaparin ppx    I have performed a physical exam and reviewed and updated ROS and Plan today (8/2/2022). In review of yesterday's note (8/1/2022), there are no changes except as documented above.

## 2022-08-02 NOTE — PROGRESS NOTES
Oral & Facial Surgery  Progress note     ID:  s/p I&D of lateral pharyngeal space abscess and extraction of tooth #19.  POD 1     Subjective:  I feel much better     Exam:    Extraction sites appropriately erythematous.  No purulence intraorally.  No significant heme or d/c.  Floor of mouth soft.  Buccal vestibule soft. IBOM soft.  Neck has decreased STS and is softer.       Assessment:  Progressing Well s/p I&D.       Plan  Continue IV Abx and follow cultures.    I will follow peripherally.     Thank you,  Miah Mcmahon, DDS  [phone]

## 2022-08-03 LAB
ANION GAP SERPL CALC-SCNC: 9 MMOL/L (ref 7–16)
ANISOCYTOSIS BLD QL SMEAR: ABNORMAL
BASOPHILS # BLD AUTO: 0 % (ref 0–1.8)
BASOPHILS # BLD: 0 K/UL (ref 0–0.12)
BUN SERPL-MCNC: 7 MG/DL (ref 8–22)
CALCIUM SERPL-MCNC: 8.2 MG/DL (ref 8.5–10.5)
CHLORIDE SERPL-SCNC: 104 MMOL/L (ref 96–112)
CO2 SERPL-SCNC: 26 MMOL/L (ref 20–33)
CREAT SERPL-MCNC: 0.47 MG/DL (ref 0.5–1.4)
EOSINOPHIL # BLD AUTO: 0.09 K/UL (ref 0–0.51)
EOSINOPHIL NFR BLD: 1.8 % (ref 0–6.9)
ERYTHROCYTE [DISTWIDTH] IN BLOOD BY AUTOMATED COUNT: 43.4 FL (ref 35.9–50)
GFR SERPLBLD CREATININE-BSD FMLA CKD-EPI: 137 ML/MIN/1.73 M 2
GLUCOSE BLD STRIP.AUTO-MCNC: 180 MG/DL (ref 65–99)
GLUCOSE BLD STRIP.AUTO-MCNC: 180 MG/DL (ref 65–99)
GLUCOSE BLD STRIP.AUTO-MCNC: 204 MG/DL (ref 65–99)
GLUCOSE SERPL-MCNC: 123 MG/DL (ref 65–99)
HCT VFR BLD AUTO: 37.6 % (ref 42–52)
HGB BLD-MCNC: 13.2 G/DL (ref 14–18)
LYMPHOCYTES # BLD AUTO: 1.43 K/UL (ref 1–4.8)
LYMPHOCYTES NFR BLD: 28.6 % (ref 22–41)
MACROCYTES BLD QL SMEAR: ABNORMAL
MAGNESIUM SERPL-MCNC: 2.2 MG/DL (ref 1.5–2.5)
MANUAL DIFF BLD: NORMAL
MCH RBC QN AUTO: 33.5 PG (ref 27–33)
MCHC RBC AUTO-ENTMCNC: 35.1 G/DL (ref 33.7–35.3)
MCV RBC AUTO: 95.4 FL (ref 81.4–97.8)
MONOCYTES # BLD AUTO: 0.31 K/UL (ref 0–0.85)
MONOCYTES NFR BLD AUTO: 6.2 % (ref 0–13.4)
MORPHOLOGY BLD-IMP: NORMAL
NEUTROPHILS # BLD AUTO: 3.17 K/UL (ref 1.82–7.42)
NEUTROPHILS NFR BLD: 63.4 % (ref 44–72)
NRBC # BLD AUTO: 0 K/UL
NRBC BLD-RTO: 0 /100 WBC
PHOSPHATE SERPL-MCNC: 2.3 MG/DL (ref 2.5–4.5)
PLATELET # BLD AUTO: 158 K/UL (ref 164–446)
PLATELET BLD QL SMEAR: NORMAL
PMV BLD AUTO: 9.8 FL (ref 9–12.9)
POTASSIUM SERPL-SCNC: 3.3 MMOL/L (ref 3.6–5.5)
RBC # BLD AUTO: 3.94 M/UL (ref 4.7–6.1)
RBC BLD AUTO: PRESENT
SODIUM SERPL-SCNC: 139 MMOL/L (ref 135–145)
WBC # BLD AUTO: 5 K/UL (ref 4.8–10.8)

## 2022-08-03 PROCEDURE — 700102 HCHG RX REV CODE 250 W/ 637 OVERRIDE(OP): Performed by: STUDENT IN AN ORGANIZED HEALTH CARE EDUCATION/TRAINING PROGRAM

## 2022-08-03 PROCEDURE — 770001 HCHG ROOM/CARE - MED/SURG/GYN PRIV*

## 2022-08-03 PROCEDURE — 700102 HCHG RX REV CODE 250 W/ 637 OVERRIDE(OP): Performed by: INTERNAL MEDICINE

## 2022-08-03 PROCEDURE — 700102 HCHG RX REV CODE 250 W/ 637 OVERRIDE(OP): Performed by: HOSPITALIST

## 2022-08-03 PROCEDURE — 82962 GLUCOSE BLOOD TEST: CPT | Mod: 91

## 2022-08-03 PROCEDURE — 99232 SBSQ HOSP IP/OBS MODERATE 35: CPT | Performed by: HOSPITALIST

## 2022-08-03 PROCEDURE — A9270 NON-COVERED ITEM OR SERVICE: HCPCS | Performed by: INTERNAL MEDICINE

## 2022-08-03 PROCEDURE — 83735 ASSAY OF MAGNESIUM: CPT

## 2022-08-03 PROCEDURE — A9270 NON-COVERED ITEM OR SERVICE: HCPCS | Performed by: STUDENT IN AN ORGANIZED HEALTH CARE EDUCATION/TRAINING PROGRAM

## 2022-08-03 PROCEDURE — 85025 COMPLETE CBC W/AUTO DIFF WBC: CPT

## 2022-08-03 PROCEDURE — A9270 NON-COVERED ITEM OR SERVICE: HCPCS | Performed by: HOSPITALIST

## 2022-08-03 PROCEDURE — 80048 BASIC METABOLIC PNL TOTAL CA: CPT

## 2022-08-03 PROCEDURE — 85007 BL SMEAR W/DIFF WBC COUNT: CPT

## 2022-08-03 PROCEDURE — 84100 ASSAY OF PHOSPHORUS: CPT

## 2022-08-03 RX ADMIN — ACETAMINOPHEN 650 MG: 325 TABLET, FILM COATED ORAL at 05:00

## 2022-08-03 RX ADMIN — INSULIN LISPRO 3 UNITS: 100 INJECTION, SOLUTION INTRAVENOUS; SUBCUTANEOUS at 12:47

## 2022-08-03 RX ADMIN — INSULIN LISPRO 2 UNITS: 100 INJECTION, SOLUTION INTRAVENOUS; SUBCUTANEOUS at 08:26

## 2022-08-03 RX ADMIN — OXYCODONE 5 MG: 5 TABLET ORAL at 23:26

## 2022-08-03 RX ADMIN — INSULIN LISPRO 4 UNITS: 100 INJECTION, SOLUTION INTRAVENOUS; SUBCUTANEOUS at 09:21

## 2022-08-03 RX ADMIN — POTASSIUM CHLORIDE 40 MEQ: 1500 TABLET, EXTENDED RELEASE ORAL at 04:57

## 2022-08-03 RX ADMIN — INSULIN LISPRO 2 UNITS: 100 INJECTION, SOLUTION INTRAVENOUS; SUBCUTANEOUS at 22:11

## 2022-08-03 RX ADMIN — INSULIN LISPRO 4 UNITS: 100 INJECTION, SOLUTION INTRAVENOUS; SUBCUTANEOUS at 18:29

## 2022-08-03 RX ADMIN — INSULIN LISPRO 4 UNITS: 100 INJECTION, SOLUTION INTRAVENOUS; SUBCUTANEOUS at 12:48

## 2022-08-03 RX ADMIN — OXYCODONE 5 MG: 5 TABLET ORAL at 18:30

## 2022-08-03 RX ADMIN — RIVAROXABAN 10 MG: 10 TABLET, FILM COATED ORAL at 18:26

## 2022-08-03 RX ADMIN — OXYCODONE 5 MG: 5 TABLET ORAL at 12:44

## 2022-08-03 RX ADMIN — AMOXICILLIN AND CLAVULANATE POTASSIUM 1 TABLET: 875; 125 TABLET, FILM COATED ORAL at 17:09

## 2022-08-03 RX ADMIN — OXYCODONE 5 MG: 5 TABLET ORAL at 03:41

## 2022-08-03 RX ADMIN — INSULIN GLARGINE-YFGN 11 UNITS: 100 INJECTION, SOLUTION SUBCUTANEOUS at 22:11

## 2022-08-03 RX ADMIN — INSULIN LISPRO 2 UNITS: 100 INJECTION, SOLUTION INTRAVENOUS; SUBCUTANEOUS at 18:27

## 2022-08-03 RX ADMIN — AMOXICILLIN AND CLAVULANATE POTASSIUM 1 TABLET: 875; 125 TABLET, FILM COATED ORAL at 04:57

## 2022-08-03 ASSESSMENT — ENCOUNTER SYMPTOMS
SHORTNESS OF BREATH: 0
FEVER: 0
ROS GI COMMENTS: TOLERATING A DIET
CHILLS: 0

## 2022-08-03 ASSESSMENT — PAIN DESCRIPTION - PAIN TYPE
TYPE: ACUTE PAIN

## 2022-08-03 ASSESSMENT — FIBROSIS 4 INDEX: FIB4 SCORE: 3.23

## 2022-08-03 NOTE — PROGRESS NOTES
Hospital Medicine Daily Progress Note    Date of Service  8/3/2022    Chief Complaint  Jordin De Leon Jr. is a 36 y.o. male admitted 7/31/2022 with dental pain    Hospital Course  Mr. De Leon is a 36-year-old male with history of uncontrolled diabetes hypertension and dyslipidemia presented 7/31 with neck pain and swelling.  Initially patient was seen at outside facility and transferred to our facility for higher level of care.  His symptoms started with tooth pain and later started having swelling on his jaw with severe pain, denied any fever or chills no chest pain or shortness of breath or other symptoms however patient was not able to eat.  On admission labs did not show leukocytosis..  His blood sugar was elevated 374 with A1c 12.9.  CT scan showed an abscess in the floor of the mouth on the left along of the body of the mandible.  Oral surgeon was consulted by ER and patient will be taken to the OR for I&D today.  IV fluid with IV Unasyn were started.    Interval Problem Update  8/3: Mr. De Leon was evaluated and examined in the ICU. He is feeling much better. We discussed the need for insulin. We await diabetes education for teaching and supplies.     I have discussed this patient's plan of care and discharge plan at IDT rounds today with Case Management, Nursing, Nursing leadership, and other members of the IDT team.    Consultants/Specialty  critical care  Oral surgery  Code Status  Full Code    Disposition  Patient is medically cleared for discharge.   Anticipate discharge to to home with close outpatient follow-up.  I have placed the appropriate orders for post-discharge needs.    Review of Systems  Review of Systems   Constitutional: Negative for chills and fever.   Respiratory: Negative for shortness of breath.    Cardiovascular: Negative for chest pain.   Gastrointestinal:        Tolerating a diet   All other systems reviewed and are negative.       Physical Exam  Temp:  [36.4 °C (97.5 °F)-36.7 °C (98  °F)] 36.4 °C (97.5 °F)  Pulse:  [62-82] 75  Resp:  [13-20] 18  BP: ()/(54-67) 98/67  SpO2:  [93 %-97 %] 94 %    Physical Exam  Vitals and nursing note reviewed.   Constitutional:       General: He is not in acute distress.     Appearance: He is not toxic-appearing.      Comments: thin   HENT:      Mouth/Throat:      Mouth: Mucous membranes are dry.      Pharynx: Oropharynx is clear.      Comments: Left lower molar site without drainage  Eyes:      General: No scleral icterus.     Conjunctiva/sclera: Conjunctivae normal.   Cardiovascular:      Rate and Rhythm: Normal rate and regular rhythm.      Heart sounds: No murmur heard.  Pulmonary:      Effort: Pulmonary effort is normal.      Breath sounds: Normal breath sounds.   Abdominal:      General: There is no distension.      Tenderness: There is no abdominal tenderness.   Musculoskeletal:      Right lower leg: No edema.      Left lower leg: No edema.   Skin:     General: Skin is warm and dry.   Neurological:      General: No focal deficit present.      Mental Status: He is alert and oriented to person, place, and time.   Psychiatric:         Mood and Affect: Mood normal.         Behavior: Behavior normal.         Thought Content: Thought content normal.         Judgment: Judgment normal.         Fluids    Intake/Output Summary (Last 24 hours) at 8/3/2022 1415  Last data filed at 8/3/2022 0800  Gross per 24 hour   Intake 970 ml   Output 2450 ml   Net -1480 ml       Laboratory  Recent Labs     08/01/22  0241 08/02/22  0405 08/03/22  0157   WBC 12.0* 5.9 5.0   RBC 4.77 3.24* 3.94*   HEMOGLOBIN 16.1 10.9* 13.2*   HEMATOCRIT 47.4 31.2* 37.6*   MCV 99.4* 96.3 95.4   MCH 33.8* 33.6* 33.5*   MCHC 34.0 34.9 35.1   RDW 46.5 44.3 43.4   PLATELETCT 156* 124* 158*   MPV 9.9 9.9 9.8     Recent Labs     08/02/22  1245 08/02/22  1740 08/03/22  0157   SODIUM 132* 136 139   POTASSIUM 4.0 3.7 3.3*   CHLORIDE 100 102 104   CO2 23 24 26   GLUCOSE 209* 208* 123*   BUN 8 8 7*    CREATININE 0.40* 0.34* 0.47*   CALCIUM 7.7* 7.8* 8.2*                   Imaging  CT-FOREIGN FILM CAT SCAN   Final Result      [id number]   Final Result      1.  Several absent right mandibular teeth. No periapical lucencies.   2.  No mandibular fracture.           Assessment/Plan  * Abscess- (present on admission)  Assessment & Plan  CT scan showed abscess in the floor of the mouth on the left along of the body of the mandible  No sepsis and no leukocytosis  IND was done by oral surgeon on 7/31   IV Unasyn and IV fluid on admission and later switched to Augmentin  Control his blood sugar, continue Lantus  Continue Augmentin and follow-up with dentist and oral surgeon as outpatient    Type 2 diabetes mellitus without complication, without long-term current use of insulin (HCC)- (present on admission)  Assessment & Plan  Uncontrolled and A1c 12.9  Patient delivered DKA and needed ICU  DKA was resolved  Continue Lantus 20 units daily  Diabetes education to assist. Consider NPH BID if insurance is a limitation. Instead of sliding scale or carb counting with meals consider 5 units of fast-acting TID          DKA, type 1, not at goal (HCC)- (present on admission)  Assessment & Plan  Improved  Continue Lantus  Monitor blood sugar closely      Hypokalemia  Assessment & Plan  oral replacement      High anion gap metabolic acidosis- (present on admission)  Assessment & Plan  Likely related to dehydration and infection  IV fluid and labs daily  Close monitoring and control blood sugar    Hypertension- (present on admission)  Assessment & Plan  Continue amlodipine and lisinopril, home medication.    Hypertriglyceridemia- (present on admission)  Assessment & Plan   continue home medication fenofibrate  Follow-up with PCP       VTE prophylaxis: SCDs/TEDs xarelto    I have performed a physical exam and reviewed and updated ROS and Plan today (8/3/2022). In review of yesterday's note (8/2/2022), there are no changes  except as documented above.

## 2022-08-03 NOTE — DIETARY
Nutrition Services: Diabetes Education Consult   Day 3 of admit.  Jordin De Leon Jr. is a 36 y.o. male with admitting DX of Abscess and DKA, type 1, not at goal.    RD attempted bedside diabetes education, pt asleep and snoring, continued snoring while I tried to wake him. Left handout on bedside table and will attempt further education tomorrow.    RD following.

## 2022-08-04 ENCOUNTER — PHARMACY VISIT (OUTPATIENT)
Dept: PHARMACY | Facility: MEDICAL CENTER | Age: 37
End: 2022-08-04
Payer: COMMERCIAL

## 2022-08-04 VITALS
SYSTOLIC BLOOD PRESSURE: 120 MMHG | OXYGEN SATURATION: 98 % | TEMPERATURE: 96.2 F | HEART RATE: 60 BPM | DIASTOLIC BLOOD PRESSURE: 74 MMHG | RESPIRATION RATE: 17 BRPM | WEIGHT: 121.47 LBS | BODY MASS INDEX: 18.41 KG/M2 | HEIGHT: 68 IN

## 2022-08-04 LAB
GLUCOSE BLD STRIP.AUTO-MCNC: 188 MG/DL (ref 65–99)
GLUCOSE BLD STRIP.AUTO-MCNC: 195 MG/DL (ref 65–99)
GLUCOSE BLD STRIP.AUTO-MCNC: 245 MG/DL (ref 65–99)

## 2022-08-04 PROCEDURE — 700111 HCHG RX REV CODE 636 W/ 250 OVERRIDE (IP)

## 2022-08-04 PROCEDURE — 99239 HOSP IP/OBS DSCHRG MGMT >30: CPT | Performed by: INTERNAL MEDICINE

## 2022-08-04 PROCEDURE — 82962 GLUCOSE BLOOD TEST: CPT | Mod: 91

## 2022-08-04 PROCEDURE — A9270 NON-COVERED ITEM OR SERVICE: HCPCS | Performed by: INTERNAL MEDICINE

## 2022-08-04 PROCEDURE — 700102 HCHG RX REV CODE 250 W/ 637 OVERRIDE(OP): Performed by: STUDENT IN AN ORGANIZED HEALTH CARE EDUCATION/TRAINING PROGRAM

## 2022-08-04 PROCEDURE — RXMED WILLOW AMBULATORY MEDICATION CHARGE: Performed by: INTERNAL MEDICINE

## 2022-08-04 PROCEDURE — 700102 HCHG RX REV CODE 250 W/ 637 OVERRIDE(OP): Performed by: INTERNAL MEDICINE

## 2022-08-04 PROCEDURE — A9270 NON-COVERED ITEM OR SERVICE: HCPCS | Performed by: STUDENT IN AN ORGANIZED HEALTH CARE EDUCATION/TRAINING PROGRAM

## 2022-08-04 RX ORDER — OXYCODONE HYDROCHLORIDE 5 MG/1
5 TABLET ORAL EVERY 4 HOURS PRN
Qty: 30 TABLET | Refills: 0 | Status: SHIPPED | OUTPATIENT
Start: 2022-08-04 | End: 2022-08-09

## 2022-08-04 RX ORDER — INSULIN LISPRO 100 [IU]/ML
4 INJECTION, SOLUTION INTRAVENOUS; SUBCUTANEOUS
Qty: 3 ML | Refills: 0 | Status: SHIPPED | OUTPATIENT
Start: 2022-08-04 | End: 2023-03-04

## 2022-08-04 RX ORDER — POTASSIUM CHLORIDE 20 MEQ/1
40 TABLET, EXTENDED RELEASE ORAL 2 TIMES DAILY
Qty: 60 TABLET | Refills: 1 | Status: ON HOLD | OUTPATIENT
Start: 2022-08-04 | End: 2023-08-27

## 2022-08-04 RX ORDER — FENOFIBRATE 145 MG/1
145 TABLET, COATED ORAL DAILY
Qty: 90 TABLET | Refills: 0 | Status: SHIPPED | OUTPATIENT
Start: 2022-08-04 | End: 2023-07-10 | Stop reason: SDUPTHER

## 2022-08-04 RX ORDER — AMOXICILLIN AND CLAVULANATE POTASSIUM 875; 125 MG/1; MG/1
1 TABLET, FILM COATED ORAL EVERY 12 HOURS
Qty: 6 TABLET | Refills: 0 | Status: SHIPPED | OUTPATIENT
Start: 2022-08-04 | End: 2022-08-07

## 2022-08-04 RX ORDER — POTASSIUM CHLORIDE 20 MEQ/1
20 TABLET, EXTENDED RELEASE ORAL DAILY
Status: DISCONTINUED | OUTPATIENT
Start: 2022-08-04 | End: 2022-08-04

## 2022-08-04 RX ORDER — CARVEDILOL 25 MG/1
25 TABLET ORAL 2 TIMES DAILY WITH MEALS
Qty: 60 TABLET | Refills: 3 | Status: SHIPPED | OUTPATIENT
Start: 2022-08-04 | End: 2023-07-10

## 2022-08-04 RX ORDER — IBUPROFEN 400 MG/1
400 TABLET ORAL EVERY 6 HOURS PRN
Qty: 30 TABLET | Refills: 0 | Status: SHIPPED | OUTPATIENT
Start: 2022-08-04 | End: 2023-05-27

## 2022-08-04 RX ORDER — INSULIN GLARGINE 100 [IU]/ML
15 INJECTION, SOLUTION SUBCUTANEOUS EVERY EVENING
Qty: 6 ML | Refills: 1 | Status: SHIPPED | OUTPATIENT
Start: 2022-08-04 | End: 2022-08-04 | Stop reason: SDUPTHER

## 2022-08-04 RX ORDER — INSULIN GLARGINE 100 [IU]/ML
15 INJECTION, SOLUTION SUBCUTANEOUS EVERY EVENING
Qty: 5 EACH | Refills: 0 | Status: SHIPPED | OUTPATIENT
Start: 2022-08-04 | End: 2022-09-03

## 2022-08-04 RX ORDER — ATORVASTATIN CALCIUM 10 MG/1
10 TABLET, FILM COATED ORAL DAILY
Qty: 90 TABLET | Refills: 0 | Status: SHIPPED | OUTPATIENT
Start: 2022-08-04 | End: 2023-03-07

## 2022-08-04 RX ORDER — INSULIN LISPRO 100 [IU]/ML
4 INJECTION, SOLUTION INTRAVENOUS; SUBCUTANEOUS
Qty: 5 EACH | Refills: 0 | Status: SHIPPED | OUTPATIENT
Start: 2022-08-04 | End: 2022-09-03

## 2022-08-04 RX ORDER — POTASSIUM CHLORIDE 20 MEQ/1
40 TABLET, EXTENDED RELEASE ORAL 2 TIMES DAILY
Status: DISCONTINUED | OUTPATIENT
Start: 2022-08-04 | End: 2022-08-04 | Stop reason: HOSPADM

## 2022-08-04 RX ORDER — INSULIN LISPRO 100 [IU]/ML
4 INJECTION, SOLUTION INTRAVENOUS; SUBCUTANEOUS
Qty: 6 ML | Refills: 1 | Status: SHIPPED | OUTPATIENT
Start: 2022-08-04 | End: 2022-08-04 | Stop reason: SDUPTHER

## 2022-08-04 RX ORDER — KETOROLAC TROMETHAMINE 30 MG/ML
30 INJECTION, SOLUTION INTRAMUSCULAR; INTRAVENOUS ONCE
Status: COMPLETED | OUTPATIENT
Start: 2022-08-04 | End: 2022-08-04

## 2022-08-04 RX ORDER — LISINOPRIL 5 MG/1
5 TABLET ORAL DAILY
Qty: 90 TABLET | Refills: 3 | Status: SHIPPED | OUTPATIENT
Start: 2022-08-04 | End: 2023-07-10 | Stop reason: SDUPTHER

## 2022-08-04 RX ADMIN — AMOXICILLIN AND CLAVULANATE POTASSIUM 1 TABLET: 875; 125 TABLET, FILM COATED ORAL at 06:54

## 2022-08-04 RX ADMIN — ACETAMINOPHEN 650 MG: 325 TABLET, FILM COATED ORAL at 06:57

## 2022-08-04 RX ADMIN — POTASSIUM CHLORIDE 40 MEQ: 1500 TABLET, EXTENDED RELEASE ORAL at 06:53

## 2022-08-04 RX ADMIN — INSULIN LISPRO 2 UNITS: 100 INJECTION, SOLUTION INTRAVENOUS; SUBCUTANEOUS at 08:59

## 2022-08-04 RX ADMIN — INSULIN LISPRO 4 UNITS: 100 INJECTION, SOLUTION INTRAVENOUS; SUBCUTANEOUS at 08:59

## 2022-08-04 RX ADMIN — OXYCODONE 5 MG: 5 TABLET ORAL at 09:54

## 2022-08-04 RX ADMIN — KETOROLAC TROMETHAMINE 30 MG: 30 INJECTION, SOLUTION INTRAMUSCULAR; INTRAVENOUS at 01:55

## 2022-08-04 RX ADMIN — OXYCODONE 5 MG: 5 TABLET ORAL at 04:26

## 2022-08-04 RX ADMIN — LISINOPRIL 5 MG: 5 TABLET ORAL at 06:54

## 2022-08-04 RX ADMIN — INSULIN LISPRO 3 UNITS: 100 INJECTION, SOLUTION INTRAVENOUS; SUBCUTANEOUS at 13:33

## 2022-08-04 RX ADMIN — INSULIN LISPRO 4 UNITS: 100 INJECTION, SOLUTION INTRAVENOUS; SUBCUTANEOUS at 13:33

## 2022-08-04 ASSESSMENT — PAIN DESCRIPTION - PAIN TYPE
TYPE: ACUTE PAIN

## 2022-08-04 ASSESSMENT — PAIN SCALES - GENERAL: PAIN_LEVEL: 2

## 2022-08-04 NOTE — PROGRESS NOTES
Diabetes education: Met with pt this evening. Please see consult note.  Plan: Pt will need Lantus solostar pens, Humalog kwik pens both enough for 30 days ( more than one pen of each), and lakshmi pen needles. Pt states he has his oral medication as well as True metrix meter and test strips. Please send to [facility] in [facility]. CDE will call before pt leaves to confirm insulin ordered is covered. Pt was given an [facility] DM book by JACQUIE.

## 2022-08-04 NOTE — DIETARY
Nutrition Services: Diabetes Diet Education Consult   Day 4 of admit.  oJrdin De Leon Jr. is a 36 y.o. male with admitting DX of Abscess  DKA, type 1, not at goal    RD able to visit pt at bedside to provide Diabetes diet education. Pt states he already spoke with CDE and she answered his questions re: insulin. Pt has Diabetes booklet. Provided additional carbohydrate counting handout. Pt stated he did not have many questions about diet but he was willing to discuss carb counting. Discussed importance of timing and consistency of meals. RD able to answer all questions to patient's satisfaction. Pt states plan to follow-up with his doctor.    No other education needs identified at this time. Consider referral to outpatient nutrition services for continuation of education as indicated or per pt preferences.     Please re-consult RD as indicated.

## 2022-08-04 NOTE — ANESTHESIA POSTPROCEDURE EVALUATION
Patient: Jordin De Leon Jr.    Procedure Summary     Date: 07/31/22 Room / Location: [facility] OR 08 / SURGERY [facility]    Anesthesia Start: 2116 Anesthesia Stop: 2154    Procedures:       EXTRACTION, TOOTH - #19 (N/A Mouth)      INCISION AND DRAINAGE,ABSCESS,TONSILLAR OR PERITONSILLAR (N/A Mouth) Diagnosis: (LEFT PERITONSILLAR ABSCESS)    Surgeons: Miah Mcmahon D.D.S. Responsible Provider: Ruslan Yu M.D.    Anesthesia Type: general ASA Status: 3          Final Anesthesia Type: general  Last vitals  BP   Blood Pressure: 120/74    Temp   (!) 35.7 °C (96.2 °F)    Pulse   60   Resp   17    SpO2   98 %      Anesthesia Post Evaluation    Patient location during evaluation: PACU  Patient participation: complete - patient participated  Level of consciousness: awake and alert  Pain score: 2    Airway patency: patent  Anesthetic complications: no  Cardiovascular status: hemodynamically stable  Respiratory status: acceptable  Hydration status: euvolemic    PONV: none          There were no known complications for this encounter.     Nurse Pain Score: 2 (NPRS)

## 2022-08-04 NOTE — DISCHARGE SUMMARY
"Discharge Summary    CHIEF COMPLAINT ON ADMISSION  Chief Complaint   Patient presents with   • Dental Pain     Patient reports L side dental/jaw pain x3days. Patient reports pain 7/10 at this time   • Sent by MD     Patient transferred from [facility] for dental abscess. CT read states \"2.2cm abscess medial to left mandible\". Patient here for ENT consult.       Reason for Admission  Sent By EMS     Admission Date  7/31/2022    CODE STATUS  Full Code    HPI & HOSPITAL COURSE    Mr. De Leon is a 36-year-old male with history of uncontrolled diabetes hypertension and dyslipidemia presented 7/31 with neck pain and swelling.  Initially patient was seen at outside facility and transferred to our facility for higher level of care.  His symptoms started with tooth pain and later started having swelling on his jaw with severe pain, denied any fever or chills no chest pain or shortness of breath or other symptoms however patient was not able to eat.  On admission labs did not show leukocytosis..  His blood sugar was elevated 374 with A1c 12.9.  CT scan showed an abscess in the floor of the mouth on the left along of the body of the mandible.  Patient was seen by the oral surgeon, Dr. Burnette and had surgical extraction of tooth #19 next well as I&D of lateral pharyngeal space abscess on July 31.  Patient has tolerated the procedure well.  He has been on IV antibiotics which has now been transitioned to Augmentin.  Blood and wound cultures remain negative.    Facial pain and swelling has improved.  Patient has been cleared by surgery for discharge to home and outpatient follow-up.  Patient is to complete a course of Augmentin as an outpatient.  As for diabetes, patient has been started on insulin.  He has met with the diabetic educator and has been taught how to check blood sugars as well as administer insulin.  At this point patient is comfortable with insulin administration and is cleared for discharge to home.  He is " advised to follow-up closely with his primary care provider for further adjustments of diabetic medication.        Therefore, he is discharged in good and stable condition to home with close outpatient follow-up.    The patient met 2-midnight criteria for an inpatient stay at the time of discharge.    Discharge Date  8/4/22    FOLLOW UP ITEMS POST DISCHARGE  PCP in 1 week to  Oral surgery as needed  Complete course of Augmentin    DISCHARGE DIAGNOSES  Principal Problem:    Abscess POA: Yes  Active Problems:    Hyponatremia POA: Yes    Tobacco abuse POA: Yes    Hypertriglyceridemia POA: Yes    Hypertension POA: Yes    Type 2 diabetes mellitus without complication, without long-term current use of insulin (HCC) POA: Yes    High anion gap metabolic acidosis POA: Yes    Hypokalemia POA: No    DKA, type 1, not at goal (HCC) POA: Yes  Resolved Problems:    * No resolved hospital problems. *      FOLLOW UP  No future appointments.  No follow-up provider specified.    MEDICATIONS ON DISCHARGE     Medication List      START taking these medications      Instructions   amoxicillin-clavulanate 875-125 MG Tabs  Commonly known as: AUGMENTIN   Take 1 Tablet by mouth every 12 hours for 3 days.  Dose: 1 Tablet     ibuprofen 400 MG Tabs  Commonly known as: MOTRIN   Take 1 Tablet by mouth every 6 hours as needed for Moderate Pain or Mild Pain.  Dose: 400 mg     * insulin lispro 100 UNIT/ML Sopn injection PEN  Commonly known as: HumaLOG,AdmeLOG   Inject 4 Units under the skin 3 times a day before meals for 30 days.  Dose: 4 Units     * insulin lispro 100 UNIT/ML Sopn injection PEN  Commonly known as: HumaLOG,AdmeLOG   Inject 4 Units under the skin 3 times a day before meals.  Dose: 4 Units     Insulin Pen Needle 32 G x 4 mm   Doctor's comments: Per patient/formulary preference. ICD-10 code: E11.65 Uncontrolled type 1 Diabetes Mellitus  Use one pen needle in pen device to inject insulin four times daily.     Lantus SoloStar 100 UNIT/ML  Sopn injection  Generic drug: insulin glargine   Inject 15 Units under the skin every evening for 30 days.  Dose: 15 Units     oxyCODONE immediate-release 5 MG Tabs  Commonly known as: ROXICODONE   Take 1 Tablet by mouth every four hours as needed for Severe Pain for up to 5 days.  Dose: 5 mg     potassium chloride SA 20 MEQ Tbcr  Commonly known as: Kdur   Take 2 Tablets by mouth 2 times a day.  Dose: 40 mEq         * This list has 2 medication(s) that are the same as other medications prescribed for you. Read the directions carefully, and ask your doctor or other care provider to review them with you.            CONTINUE taking these medications      Instructions   acetaminophen 500 MG Tabs  Commonly known as: TYLENOL   Take 1,000 mg by mouth every four hours as needed for Mild Pain.  Dose: 1,000 mg     atorvastatin 10 MG Tabs  Commonly known as: LIPITOR   Take 1 Tablet by mouth every day.  Dose: 10 mg     carvedilol 25 MG Tabs  Commonly known as: COREG   Take 1 Tablet by mouth 2 times a day with meals.  Dose: 25 mg     fenofibrate 145 MG Tabs  Commonly known as: TRICOR   Take 1 Tablet by mouth every day.  Dose: 145 mg     lisinopril 5 MG Tabs  Commonly known as: PRINIVIL   Take 1 Tablet by mouth every day.  Dose: 5 mg     metformin 1000 MG tablet  Commonly known as: GLUCOPHAGE   Take 1 Tablet by mouth 2 times a day with meals.  Dose: 1,000 mg        STOP taking these medications    amLODIPine 10 MG Tabs  Commonly known as: NORVASC     clotrimazole 1 % Crea  Commonly known as: LOTRIMIN     Jardiance 10 MG Tabs  Generic drug: Empagliflozin     True Metrix Blood Glucose Test strip  Generic drug: glucose blood            Allergies  Allergies   Allergen Reactions   • Hydrocodone-Acetaminophen      itchy       DIET  Orders Placed This Encounter   Procedures   • Diet Order Diet: Level 6 - Soft and Bite Sized; Liquid level: Level 0 - Thin; Second Modifier: (optional): Consistent CHO (Diabetic)     Standing Status:    Standing     Number of Occurrences:   1     Order Specific Question:   Diet:     Answer:   Level 6 - Soft and Bite Sized [23]     Order Specific Question:   Liquid level     Answer:   Level 0 - Thin     Order Specific Question:   Second Modifier: (optional)     Answer:   Consistent CHO (Diabetic) [4]       ACTIVITY  As tolerated.  Weight bearing as tolerated    CONSULTATIONS  Oral surgery    PROCEDURES  Extraction of tooth #19 as well as I&D of abscess, July 31    LABORATORY  Lab Results   Component Value Date    SODIUM 139 08/03/2022    POTASSIUM 3.3 (L) 08/03/2022    CHLORIDE 104 08/03/2022    CO2 26 08/03/2022    GLUCOSE 123 (H) 08/03/2022    BUN 7 (L) 08/03/2022    CREATININE 0.47 (L) 08/03/2022        Lab Results   Component Value Date    WBC 5.0 08/03/2022    HEMOGLOBIN 13.2 (L) 08/03/2022    HEMATOCRIT 37.6 (L) 08/03/2022    PLATELETCT 158 (L) 08/03/2022        Total time of the discharge process exceeds 40 minutes.

## 2022-08-04 NOTE — PROGRESS NOTES
Diabetes education: CDE called [facility] x 3 to confirm coverage of dc meds. All meds covered ( and MD reordered previous meds, but dc'd jardinace and amlodipine as well as true metrix ( pt has a one touch with all supplies and refills at [facility]). [facility] needed pens ordered per box of 5. MD changed. [facility] has to order Lispro pens and will be available tomorrow. CDE asked MD to order one Lispro pen per approved services and meds to beds so pt can dc today. SW and pharmacy notified as well as RN and pt.  Reviewed what effects blood sugars as well.  Plan: No further needs at this time.

## 2022-08-04 NOTE — CONSULTS
"Diabetes education: Pt has a hx of diabetes on Jardiance and metformin at home. Pt was admitted with an abscess \"medial to the left mandible\" and blood sugars of 374 with Hga1c of 12.9%.  Pt is currently on Glargine 11 units pm with Lispro 4 units tid meals and Lispro per sliding scale coverage ac and hs. Blood sugars are 207 ( 2 + 4 units), 205   ( 3 units), 180 ( 2 + 4 units), and 204 ( 3 + 4 units).  Met with pt this evening. Reviewed Jardiance, how it works and how to take it, hypoglycemia and hyperglycemia pt states he was aware, Hga1c and insulin.  Pt had practiced with nursing in ICU on drawing up and injecting insulin . Pt had a meter and test strips and had done finger sticks.  CDE taught to use insulin pens and practiced with saline pens. Handouts given.  Pt states he was hungry and MD was to order more food and rice for dinner. CDE sent text to nutrition rep ( it may be too late).   Pt has [employer] insurance and uses [employer] [facility] in [facility] so prescriptions need to go there. CDE will call [facility] to make sure insulin ordered is covered before pt discharges.  Plan: Pt will need Lantus solostar pens, Humalog kwik pens both enough for 30 days ( more than one pen of each), and lakshmi pen needles. Pt states he has his oral medication as well as True metrix meter and test strips. Please send to [facility] in [facility]. CDE will call before pt leaves to confirm insulin ordered is covered. Pt was given an [facility] DM book by RD.  "

## 2022-08-04 NOTE — PROGRESS NOTES
Assumed care of patient at 1900. Patient is A&O x4, states pain level is 0/10. Bed locked in lowest position with 2 rail up. Call light  in place, belongings at bedside. ourly rounding is in place.

## 2022-08-04 NOTE — PROGRESS NOTES
Pt cleared for discharge.  IV removed, no complications noted.  VSS.  Pt showered, toileted, and dad at bedside to provide transportation home.  Medications picked up form pharmacy.  Discharge education given, all questions answered.

## 2022-08-04 NOTE — DISCHARGE PLANNING
Case Management Discharge Planning    Admission Date: 7/31/2022  GMLOS: 2.3  ALOS: 4    6-Clicks ADL Score: 24  6-Clicks Mobility Score: 24      Anticipated Discharge Dispo: Discharge Disposition: Discharged to home/self care (01)    DME Needed: No    Action(s) Taken: OTHER    Escalations Completed: None    Medically Clear: Yes    Next Steps: Team met to review status. Patient will be discharged to the community with no needs. AS has been completed for medication not able to be filled at local pharmacy.     Barriers to Discharge: None    Is the patient up for discharge tomorrow: No

## 2022-08-04 NOTE — PROGRESS NOTES
1700: Report given to receiving RN Kaylee, all questions answered.    1711: Patient transported to [facility] with all patient belongings.

## 2022-08-04 NOTE — PROGRESS NOTES
4 Eyes Skin Assessment Completed by MAYURI Page and MAYURI Reyes.    Head WDL  Ears WDL  Nose WDL  Mouth Redness, Swollen  Neck WDL  Breast/Chest WDL  Shoulder Blades WDL  Spine WDL  (R) Arm/Elbow/Hand WDL  (L) Arm/Elbow/Hand WDL  Abdomen WDL  Groin WDL  Scrotum/Coccyx/Buttocks WDL  (R) Leg WDL  (L) Leg WDL  (R) Heel/Foot/Toe WDL  (L) Heel/Foot/Toe WDL          Devices In Places Blood Pressure Cuff      Interventions In Place N/A    Possible Skin Injury No    Pictures Uploaded Into Epic N/A  Wound Consult Placed N/A  RN Wound Prevention Protocol Ordered No

## 2022-08-05 LAB
BACTERIA BLD CULT: NORMAL
BACTERIA BLD CULT: NORMAL
BACTERIA SPEC ANAEROBE CULT: NORMAL
SIGNIFICANT IND 70042: NORMAL
SITE SITE: NORMAL
SOURCE SOURCE: NORMAL

## 2022-09-21 NOTE — DOCUMENTATION QUERY
[facility]                                                                       Query Response Note      PATIENT:               LINDSEY MONK  ACCT #:                  [id number]  MRN:                     [id number]  :                      1985  ADMIT DATE:       2022 9:22 AM  DISCH DATE:        2022 2:52 PM  RESPONDING  PROVIDER #:        [id number]           QUERY TEXT:    There is conflicting documentation in the medical record.      DM type 2  is documented in the H&P, progress notes and D/C summary.      DKA, type 1 is documented in Progress notes and Dietary.    Based on treatment, clinical findings and risk factors, can this documentation be further clarified?    The patient's Clinical Indicators include:  Clinical indicators:  Glucose on admission 173,  328    Treatment and monitoring:  hypoglycemic drug    Risk Factors:  hyponatremia  neuropathy    [email or web address]  Options provided:   -- DKA, type 2   -- DKA, type 1   -- DM type 2  without complication   -- DM type 1 without complication   -- Unable to determine      Query created by: Shantell Thomas on 2022 10:02 AM    RESPONSE TEXT:    DKA, type 1          Electronically signed by:  EUGENIO JOHNSTON DO 2022 11:33 AM

## 2023-03-07 PROBLEM — E10.10 DKA, TYPE 1, NOT AT GOAL (HCC): Status: RESOLVED | Noted: 2022-08-02 | Resolved: 2023-03-07

## 2023-07-10 PROBLEM — F10.90 ALCOHOL USE DISORDER: Status: ACTIVE | Noted: 2023-07-10

## 2023-08-25 PROBLEM — F15.10 METHAMPHETAMINE USE (HCC): Status: ACTIVE | Noted: 2023-08-25

## 2023-08-25 PROBLEM — L03.211 CELLULITIS OF CHIN: Status: ACTIVE | Noted: 2023-08-25

## 2023-08-25 PROBLEM — R79.89 PSEUDOHYPONATREMIA: Status: ACTIVE | Noted: 2020-06-15

## 2024-02-04 PROBLEM — F10.10 ALCOHOL ABUSE: Status: ACTIVE | Noted: 2024-02-04

## 2024-02-04 PROBLEM — K59.00 OBSTIPATION: Status: ACTIVE | Noted: 2024-02-04

## 2024-02-04 PROBLEM — M54.41 ACUTE RIGHT-SIDED BACK PAIN WITH SCIATICA: Status: ACTIVE | Noted: 2024-02-04

## 2024-02-04 PROBLEM — R10.9 ABDOMINAL PAIN: Status: ACTIVE | Noted: 2024-02-04

## 2024-02-04 PROBLEM — F15.10 AMPHETAMINE ABUSE (HCC): Status: ACTIVE | Noted: 2024-02-04

## 2024-02-06 PROBLEM — M79.2 NEUROPATHIC PAIN: Status: ACTIVE | Noted: 2024-02-06

## (undated) DEVICE — BLADE SURGICAL #15 - (50/BX 3BX/CA)

## (undated) DEVICE — PACK MINOR BASIN - (2EA/CA)

## (undated) DEVICE — GLOVE BIOGEL SZ 7.5 SURGICAL PF LTX - (50PR/BX 4BX/CA)

## (undated) DEVICE — SUTURE GENERAL

## (undated) DEVICE — SUTURE 0 SILK TIES (36PK/BX)

## (undated) DEVICE — SENSOR OXIMETER ADULT SPO2 RD SET (20EA/BX)

## (undated) DEVICE — CHLORAPREP 26 ML APPLICATOR - ORANGE TINT(25/CA)

## (undated) DEVICE — GLOVE BIOGEL PI INDICATOR SZ 7.5 SURGICAL PF LF -(50/BX 4BX/CA)

## (undated) DEVICE — TOWEL STOP TIMEOUT SAFETY FLAG (40EA/CA)

## (undated) DEVICE — ELECTRODE DUAL RETURN W/ CORD - (50/PK)

## (undated) DEVICE — CATHETER IV 14 GA X 2 ---SURG.& SDS ONLY---(200EA/CA)

## (undated) DEVICE — DRAPE SURGICAL U 77X120 - (10/CA)

## (undated) DEVICE — LACTATED RINGERS INJ 1000 ML - (14EA/CA 60CA/PF)

## (undated) DEVICE — GOWN WARMING STANDARD FLEX - (30/CA)

## (undated) DEVICE — SET LEADWIRE 5 LEAD BEDSIDE DISPOSABLE ECG (1SET OF 5/EA)

## (undated) DEVICE — SUTURE 3-0 CHROMIC GUT SH 27 (36PK/BX)"

## (undated) DEVICE — SUCTION INSTRUMENT YANKAUER BULBOUS TIP W/O VENT (50EA/CA)

## (undated) DEVICE — SODIUM CHL IRRIGATION 0.9% 1000ML (12EA/CA)

## (undated) DEVICE — CANISTER SUCTION 3000ML MECHANICAL FILTER AUTO SHUTOFF MEDI-VAC NONSTERILE LF DISP  (40EA/CA)

## (undated) DEVICE — NEEDLE NON SAFETY 25 GA X 1 1/2 IN HYPO (100EA/BX)

## (undated) DEVICE — SLEEVE VASO CALF MED - (10PR/CA)

## (undated) DEVICE — JAW BRA #93

## (undated) DEVICE — SET EXTENSION WITH 2 PORTS (48EA/CA) ***PART #2C8610 IS A SUBSTITUTE*****

## (undated) DEVICE — TUBING CLEARLINK DUO-VENT - C-FLO (48EA/CA)

## (undated) DEVICE — SPONGE GAUZESTER 4 X 4 4PLY - (128PK/CA)